# Patient Record
Sex: FEMALE | Race: WHITE | NOT HISPANIC OR LATINO | Employment: OTHER | URBAN - METROPOLITAN AREA
[De-identification: names, ages, dates, MRNs, and addresses within clinical notes are randomized per-mention and may not be internally consistent; named-entity substitution may affect disease eponyms.]

---

## 2021-12-02 PROCEDURE — U0003 INFECTIOUS AGENT DETECTION BY NUCLEIC ACID (DNA OR RNA); SEVERE ACUTE RESPIRATORY SYNDROME CORONAVIRUS 2 (SARS-COV-2) (CORONAVIRUS DISEASE [COVID-19]), AMPLIFIED PROBE TECHNIQUE, MAKING USE OF HIGH THROUGHPUT TECHNOLOGIES AS DESCRIBED BY CMS-2020-01-R: HCPCS | Mod: ORL | Performed by: FAMILY MEDICINE

## 2021-12-03 ENCOUNTER — LAB REQUISITION (OUTPATIENT)
Dept: LAB | Facility: CLINIC | Age: 86
End: 2021-12-03
Payer: MEDICARE

## 2021-12-04 LAB — SARS-COV-2 RNA RESP QL NAA+PROBE: POSITIVE

## 2022-01-01 ENCOUNTER — LAB REQUISITION (OUTPATIENT)
Dept: LAB | Facility: CLINIC | Age: 87
End: 2022-01-01
Payer: MEDICARE

## 2022-01-01 DIAGNOSIS — R41.0 DISORIENTATION, UNSPECIFIED: ICD-10-CM

## 2022-01-01 LAB
ALBUMIN UR-MCNC: NEGATIVE MG/DL
APPEARANCE UR: CLEAR
BACTERIA #/AREA URNS HPF: ABNORMAL /HPF
BACTERIA UR CULT: NORMAL
BILIRUB UR QL STRIP: NEGATIVE
COLOR UR AUTO: COLORLESS
GLUCOSE UR STRIP-MCNC: NEGATIVE MG/DL
HGB UR QL STRIP: NEGATIVE
KETONES UR STRIP-MCNC: NEGATIVE MG/DL
LEUKOCYTE ESTERASE UR QL STRIP: ABNORMAL
NITRATE UR QL: NEGATIVE
PH UR STRIP: 5.5 [PH] (ref 5–7)
RBC URINE: <1 /HPF
SP GR UR STRIP: 1.01 (ref 1–1.03)
SQUAMOUS EPITHELIAL: 3 /HPF
UROBILINOGEN UR STRIP-MCNC: <2 MG/DL
WBC URINE: 8 /HPF

## 2022-01-01 PROCEDURE — 87086 URINE CULTURE/COLONY COUNT: CPT | Mod: ORL | Performed by: FAMILY MEDICINE

## 2022-01-01 PROCEDURE — 81001 URINALYSIS AUTO W/SCOPE: CPT | Mod: ORL | Performed by: FAMILY MEDICINE

## 2023-01-01 ENCOUNTER — DOCUMENTATION ONLY (OUTPATIENT)
Dept: OTHER | Facility: CLINIC | Age: 88
End: 2023-01-01
Payer: MEDICARE

## 2023-01-01 ENCOUNTER — HOSPITAL ENCOUNTER (INPATIENT)
Facility: CLINIC | Age: 88
LOS: 3 days | Discharge: HOSPICE | DRG: 064 | End: 2023-02-23
Attending: EMERGENCY MEDICINE | Admitting: HOSPITALIST
Payer: MEDICARE

## 2023-01-01 ENCOUNTER — APPOINTMENT (OUTPATIENT)
Dept: CT IMAGING | Facility: CLINIC | Age: 88
DRG: 064 | End: 2023-01-01
Attending: EMERGENCY MEDICINE
Payer: MEDICARE

## 2023-01-01 ENCOUNTER — HOSPITAL ENCOUNTER (INPATIENT)
Facility: CLINIC | Age: 88
LOS: 1 days | End: 2023-02-24
Attending: HOSPITALIST | Admitting: INTERNAL MEDICINE
Payer: OTHER MISCELLANEOUS

## 2023-01-01 VITALS — RESPIRATION RATE: 26 BRPM

## 2023-01-01 VITALS
SYSTOLIC BLOOD PRESSURE: 115 MMHG | DIASTOLIC BLOOD PRESSURE: 75 MMHG | TEMPERATURE: 99.9 F | HEART RATE: 80 BPM | RESPIRATION RATE: 24 BRPM | OXYGEN SATURATION: 92 %

## 2023-01-01 DIAGNOSIS — I63.512 ACUTE ISCHEMIC LEFT MCA STROKE (H): ICD-10-CM

## 2023-01-01 DIAGNOSIS — I10 UNCONTROLLED HYPERTENSION: ICD-10-CM

## 2023-01-01 DIAGNOSIS — Z66 DNR (DO NOT RESUSCITATE): ICD-10-CM

## 2023-01-01 LAB
ANION GAP SERPL CALCULATED.3IONS-SCNC: 11 MMOL/L (ref 7–15)
APTT PPP: 26 SECONDS (ref 22–38)
ATRIAL RATE - MUSE: 70 BPM
BASOPHILS # BLD AUTO: 0 10E3/UL (ref 0–0.2)
BASOPHILS NFR BLD AUTO: 0 %
BUN SERPL-MCNC: 16.9 MG/DL (ref 8–23)
CALCIUM SERPL-MCNC: 9.1 MG/DL (ref 8.2–9.6)
CHLORIDE SERPL-SCNC: 104 MMOL/L (ref 98–107)
CREAT SERPL-MCNC: 0.66 MG/DL (ref 0.51–0.95)
DEPRECATED HCO3 PLAS-SCNC: 28 MMOL/L (ref 22–29)
DIASTOLIC BLOOD PRESSURE - MUSE: NORMAL MMHG
EOSINOPHIL # BLD AUTO: 0.1 10E3/UL (ref 0–0.7)
EOSINOPHIL NFR BLD AUTO: 1 %
ERYTHROCYTE [DISTWIDTH] IN BLOOD BY AUTOMATED COUNT: 17.4 % (ref 10–15)
GFR SERPL CREATININE-BSD FRML MDRD: 81 ML/MIN/1.73M2
GLUCOSE SERPL-MCNC: 144 MG/DL (ref 70–99)
HCT VFR BLD AUTO: 39.8 % (ref 35–47)
HGB BLD-MCNC: 12 G/DL (ref 11.7–15.7)
HOLD SPECIMEN: NORMAL
IMM GRANULOCYTES # BLD: 0.1 10E3/UL
IMM GRANULOCYTES NFR BLD: 1 %
INR PPP: 0.95 (ref 0.85–1.15)
INTERPRETATION ECG - MUSE: NORMAL
LYMPHOCYTES # BLD AUTO: 3.8 10E3/UL (ref 0.8–5.3)
LYMPHOCYTES NFR BLD AUTO: 41 %
MCH RBC QN AUTO: 26.5 PG (ref 26.5–33)
MCHC RBC AUTO-ENTMCNC: 30.2 G/DL (ref 31.5–36.5)
MCV RBC AUTO: 88 FL (ref 78–100)
MONOCYTES # BLD AUTO: 0.7 10E3/UL (ref 0–1.3)
MONOCYTES NFR BLD AUTO: 8 %
NEUTROPHILS # BLD AUTO: 4.6 10E3/UL (ref 1.6–8.3)
NEUTROPHILS NFR BLD AUTO: 49 %
NRBC # BLD AUTO: 0 10E3/UL
NRBC BLD AUTO-RTO: 0 /100
P AXIS - MUSE: 57 DEGREES
PLATELET # BLD AUTO: 273 10E3/UL (ref 150–450)
POTASSIUM SERPL-SCNC: 3.6 MMOL/L (ref 3.4–5.3)
PR INTERVAL - MUSE: 182 MS
QRS DURATION - MUSE: 84 MS
QT - MUSE: 430 MS
QTC - MUSE: 464 MS
R AXIS - MUSE: 9 DEGREES
RBC # BLD AUTO: 4.52 10E6/UL (ref 3.8–5.2)
SODIUM SERPL-SCNC: 143 MMOL/L (ref 136–145)
SYSTOLIC BLOOD PRESSURE - MUSE: NORMAL MMHG
T AXIS - MUSE: 106 DEGREES
TROPONIN T SERPL HS-MCNC: 19 NG/L
VENTRICULAR RATE- MUSE: 70 BPM
WBC # BLD AUTO: 9.3 10E3/UL (ref 4–11)

## 2023-01-01 PROCEDURE — 85014 HEMATOCRIT: CPT | Performed by: EMERGENCY MEDICINE

## 2023-01-01 PROCEDURE — 250N000013 HC RX MED GY IP 250 OP 250 PS 637

## 2023-01-01 PROCEDURE — 250N000009 HC RX 250

## 2023-01-01 PROCEDURE — 110N000005 HC R&B HOSPICE, ACCENT

## 2023-01-01 PROCEDURE — 99207 PR NO BILLABLE SERVICE THIS VISIT: CPT | Performed by: PHYSICIAN ASSISTANT

## 2023-01-01 PROCEDURE — 99291 CRITICAL CARE FIRST HOUR: CPT | Performed by: PHYSICIAN ASSISTANT

## 2023-01-01 PROCEDURE — 70450 CT HEAD/BRAIN W/O DYE: CPT | Mod: MG

## 2023-01-01 PROCEDURE — 82310 ASSAY OF CALCIUM: CPT | Performed by: EMERGENCY MEDICINE

## 2023-01-01 PROCEDURE — 250N000011 HC RX IP 250 OP 636: Performed by: EMERGENCY MEDICINE

## 2023-01-01 PROCEDURE — 99292 CRITICAL CARE ADDL 30 MIN: CPT

## 2023-01-01 PROCEDURE — 99231 SBSQ HOSP IP/OBS SF/LOW 25: CPT

## 2023-01-01 PROCEDURE — 250N000013 HC RX MED GY IP 250 OP 250 PS 637: Performed by: PHYSICIAN ASSISTANT

## 2023-01-01 PROCEDURE — 250N000009 HC RX 250: Performed by: PHYSICIAN ASSISTANT

## 2023-01-01 PROCEDURE — 120N000001 HC R&B MED SURG/OB

## 2023-01-01 PROCEDURE — 250N000011 HC RX IP 250 OP 636

## 2023-01-01 PROCEDURE — 250N000009 HC RX 250: Performed by: EMERGENCY MEDICINE

## 2023-01-01 PROCEDURE — 36415 COLL VENOUS BLD VENIPUNCTURE: CPT | Performed by: EMERGENCY MEDICINE

## 2023-01-01 PROCEDURE — 85041 AUTOMATED RBC COUNT: CPT | Performed by: EMERGENCY MEDICINE

## 2023-01-01 PROCEDURE — 85610 PROTHROMBIN TIME: CPT | Performed by: EMERGENCY MEDICINE

## 2023-01-01 PROCEDURE — 93005 ELECTROCARDIOGRAM TRACING: CPT

## 2023-01-01 PROCEDURE — 85730 THROMBOPLASTIN TIME PARTIAL: CPT | Performed by: EMERGENCY MEDICINE

## 2023-01-01 PROCEDURE — 99291 CRITICAL CARE FIRST HOUR: CPT | Mod: 25

## 2023-01-01 PROCEDURE — 99231 SBSQ HOSP IP/OBS SF/LOW 25: CPT | Performed by: PHYSICIAN ASSISTANT

## 2023-01-01 PROCEDURE — G1010 CDSM STANSON: HCPCS

## 2023-01-01 PROCEDURE — 70496 CT ANGIOGRAPHY HEAD: CPT | Mod: MG

## 2023-01-01 PROCEDURE — 96374 THER/PROPH/DIAG INJ IV PUSH: CPT | Mod: 59

## 2023-01-01 PROCEDURE — 84484 ASSAY OF TROPONIN QUANT: CPT | Performed by: EMERGENCY MEDICINE

## 2023-01-01 RX ORDER — ACETAMINOPHEN 650 MG/1
650 SUPPOSITORY RECTAL EVERY 6 HOURS PRN
Status: DISCONTINUED | OUTPATIENT
Start: 2023-01-01 | End: 2023-01-01

## 2023-01-01 RX ORDER — PROCHLORPERAZINE MALEATE 5 MG
5 TABLET ORAL EVERY 6 HOURS PRN
Status: DISCONTINUED | OUTPATIENT
Start: 2023-01-01 | End: 2023-01-01

## 2023-01-01 RX ORDER — CARBOXYMETHYLCELLULOSE SODIUM 5 MG/ML
1-2 SOLUTION/ DROPS OPHTHALMIC
Status: DISCONTINUED | OUTPATIENT
Start: 2023-01-01 | End: 2023-01-01 | Stop reason: HOSPADM

## 2023-01-01 RX ORDER — MORPHINE SULFATE 20 MG/ML
5 SOLUTION ORAL
Status: DISCONTINUED | OUTPATIENT
Start: 2023-01-01 | End: 2023-01-01

## 2023-01-01 RX ORDER — LORAZEPAM 0.5 MG/1
1 TABLET ORAL
Status: DISCONTINUED | OUTPATIENT
Start: 2023-01-01 | End: 2023-01-01 | Stop reason: HOSPADM

## 2023-01-01 RX ORDER — ONDANSETRON 2 MG/ML
4 INJECTION INTRAMUSCULAR; INTRAVENOUS EVERY 6 HOURS PRN
Status: CANCELLED | OUTPATIENT
Start: 2023-01-01

## 2023-01-01 RX ORDER — NALOXONE HYDROCHLORIDE 0.4 MG/ML
0.1 INJECTION, SOLUTION INTRAMUSCULAR; INTRAVENOUS; SUBCUTANEOUS
Status: DISCONTINUED | OUTPATIENT
Start: 2023-01-01 | End: 2023-01-01

## 2023-01-01 RX ORDER — PROCHLORPERAZINE 25 MG
12.5 SUPPOSITORY, RECTAL RECTAL EVERY 12 HOURS PRN
Status: CANCELLED | OUTPATIENT
Start: 2023-01-01

## 2023-01-01 RX ORDER — NALOXONE HYDROCHLORIDE 0.4 MG/ML
0.1 INJECTION, SOLUTION INTRAMUSCULAR; INTRAVENOUS; SUBCUTANEOUS
Status: CANCELLED | OUTPATIENT
Start: 2023-01-01

## 2023-01-01 RX ORDER — MORPHINE SULFATE 2 MG/ML
1 INJECTION, SOLUTION INTRAMUSCULAR; INTRAVENOUS
Status: CANCELLED | OUTPATIENT
Start: 2023-01-01

## 2023-01-01 RX ORDER — LORAZEPAM 2 MG/ML
1 INJECTION INTRAMUSCULAR
Status: DISCONTINUED | OUTPATIENT
Start: 2023-01-01 | End: 2023-01-01

## 2023-01-01 RX ORDER — LORAZEPAM 0.5 MG/1
1 TABLET ORAL
Status: CANCELLED | OUTPATIENT
Start: 2023-01-01

## 2023-01-01 RX ORDER — CARBOXYMETHYLCELLULOSE SODIUM 5 MG/ML
1-2 SOLUTION/ DROPS OPHTHALMIC
Status: CANCELLED | OUTPATIENT
Start: 2023-01-01

## 2023-01-01 RX ORDER — NALOXONE HYDROCHLORIDE 0.4 MG/ML
0.2 INJECTION, SOLUTION INTRAMUSCULAR; INTRAVENOUS; SUBCUTANEOUS
Status: DISCONTINUED | OUTPATIENT
Start: 2023-01-01 | End: 2023-01-01 | Stop reason: HOSPADM

## 2023-01-01 RX ORDER — LORAZEPAM 2 MG/ML
1 INJECTION INTRAMUSCULAR
Status: DISCONTINUED | OUTPATIENT
Start: 2023-01-01 | End: 2023-01-01 | Stop reason: HOSPADM

## 2023-01-01 RX ORDER — ATROPINE SULFATE 10 MG/ML
2 SOLUTION/ DROPS OPHTHALMIC EVERY 4 HOURS PRN
Status: DISCONTINUED | OUTPATIENT
Start: 2023-01-01 | End: 2023-01-01 | Stop reason: HOSPADM

## 2023-01-01 RX ORDER — ONDANSETRON 2 MG/ML
4 INJECTION INTRAMUSCULAR; INTRAVENOUS ONCE
Status: COMPLETED | OUTPATIENT
Start: 2023-01-01 | End: 2023-01-01

## 2023-01-01 RX ORDER — ONDANSETRON 4 MG/1
4 TABLET, ORALLY DISINTEGRATING ORAL EVERY 6 HOURS PRN
Status: DISCONTINUED | OUTPATIENT
Start: 2023-01-01 | End: 2023-01-01

## 2023-01-01 RX ORDER — NALOXONE HYDROCHLORIDE 0.4 MG/ML
0.2 INJECTION, SOLUTION INTRAMUSCULAR; INTRAVENOUS; SUBCUTANEOUS
Status: DISCONTINUED | OUTPATIENT
Start: 2023-01-01 | End: 2023-01-01

## 2023-01-01 RX ORDER — MORPHINE SULFATE 2 MG/ML
2 INJECTION, SOLUTION INTRAMUSCULAR; INTRAVENOUS
Status: DISCONTINUED | OUTPATIENT
Start: 2023-01-01 | End: 2023-01-01

## 2023-01-01 RX ORDER — ACETAMINOPHEN 650 MG/1
650 SUPPOSITORY RECTAL EVERY 6 HOURS PRN
Status: DISCONTINUED | OUTPATIENT
Start: 2023-01-01 | End: 2023-01-01 | Stop reason: HOSPADM

## 2023-01-01 RX ORDER — MORPHINE SULFATE 20 MG/ML
10 SOLUTION ORAL
Status: DISCONTINUED | OUTPATIENT
Start: 2023-01-01 | End: 2023-01-01 | Stop reason: HOSPADM

## 2023-01-01 RX ORDER — PROCHLORPERAZINE 25 MG
12.5 SUPPOSITORY, RECTAL RECTAL EVERY 12 HOURS PRN
Status: DISCONTINUED | OUTPATIENT
Start: 2023-01-01 | End: 2023-01-01

## 2023-01-01 RX ORDER — MORPHINE SULFATE 2 MG/ML
2 INJECTION, SOLUTION INTRAMUSCULAR; INTRAVENOUS
Status: DISCONTINUED | OUTPATIENT
Start: 2023-01-01 | End: 2023-01-01 | Stop reason: HOSPADM

## 2023-01-01 RX ORDER — ONDANSETRON 4 MG/1
4 TABLET, ORALLY DISINTEGRATING ORAL EVERY 6 HOURS PRN
Status: CANCELLED | OUTPATIENT
Start: 2023-01-01

## 2023-01-01 RX ORDER — ONDANSETRON 2 MG/ML
4 INJECTION INTRAMUSCULAR; INTRAVENOUS EVERY 6 HOURS PRN
Status: DISCONTINUED | OUTPATIENT
Start: 2023-01-01 | End: 2023-01-01 | Stop reason: HOSPADM

## 2023-01-01 RX ORDER — ECHINACEA PURPUREA EXTRACT 125 MG
1 TABLET ORAL
Status: DISCONTINUED | OUTPATIENT
Start: 2023-01-01 | End: 2023-01-01

## 2023-01-01 RX ORDER — PROCHLORPERAZINE 25 MG
12.5 SUPPOSITORY, RECTAL RECTAL EVERY 12 HOURS PRN
Status: DISCONTINUED | OUTPATIENT
Start: 2023-01-01 | End: 2023-01-01 | Stop reason: HOSPADM

## 2023-01-01 RX ORDER — MORPHINE SULFATE 10 MG/5ML
5 SOLUTION ORAL
Status: DISCONTINUED | OUTPATIENT
Start: 2023-01-01 | End: 2023-01-01

## 2023-01-01 RX ORDER — PROCHLORPERAZINE MALEATE 5 MG
5 TABLET ORAL EVERY 6 HOURS PRN
Status: DISCONTINUED | OUTPATIENT
Start: 2023-01-01 | End: 2023-01-01 | Stop reason: HOSPADM

## 2023-01-01 RX ORDER — NALOXONE HYDROCHLORIDE 0.4 MG/ML
0.1 INJECTION, SOLUTION INTRAMUSCULAR; INTRAVENOUS; SUBCUTANEOUS
Status: DISCONTINUED | OUTPATIENT
Start: 2023-01-01 | End: 2023-01-01 | Stop reason: HOSPADM

## 2023-01-01 RX ORDER — ONDANSETRON 4 MG/1
4 TABLET, ORALLY DISINTEGRATING ORAL EVERY 6 HOURS PRN
Status: DISCONTINUED | OUTPATIENT
Start: 2023-01-01 | End: 2023-01-01 | Stop reason: HOSPADM

## 2023-01-01 RX ORDER — MINERAL OIL/HYDROPHIL PETROLAT
OINTMENT (GRAM) TOPICAL
Status: DISCONTINUED | OUTPATIENT
Start: 2023-01-01 | End: 2023-01-01

## 2023-01-01 RX ORDER — CARBOXYMETHYLCELLULOSE SODIUM 5 MG/ML
1-2 SOLUTION/ DROPS OPHTHALMIC
Status: DISCONTINUED | OUTPATIENT
Start: 2023-01-01 | End: 2023-01-01

## 2023-01-01 RX ORDER — NALOXONE HYDROCHLORIDE 0.4 MG/ML
0.2 INJECTION, SOLUTION INTRAMUSCULAR; INTRAVENOUS; SUBCUTANEOUS
Status: CANCELLED | OUTPATIENT
Start: 2023-01-01

## 2023-01-01 RX ORDER — LORAZEPAM 2 MG/ML
1 INJECTION INTRAMUSCULAR
Status: CANCELLED | OUTPATIENT
Start: 2023-01-01

## 2023-01-01 RX ORDER — PROCHLORPERAZINE MALEATE 5 MG
5 TABLET ORAL EVERY 6 HOURS PRN
Status: CANCELLED | OUTPATIENT
Start: 2023-01-01

## 2023-01-01 RX ORDER — ATROPINE SULFATE 10 MG/ML
2 SOLUTION/ DROPS OPHTHALMIC EVERY 4 HOURS PRN
Status: DISCONTINUED | OUTPATIENT
Start: 2023-01-01 | End: 2023-01-01

## 2023-01-01 RX ORDER — ONDANSETRON 2 MG/ML
4 INJECTION INTRAMUSCULAR; INTRAVENOUS EVERY 6 HOURS PRN
Status: DISCONTINUED | OUTPATIENT
Start: 2023-01-01 | End: 2023-01-01

## 2023-01-01 RX ORDER — LORAZEPAM 0.5 MG/1
1 TABLET ORAL
Status: DISCONTINUED | OUTPATIENT
Start: 2023-01-01 | End: 2023-01-01

## 2023-01-01 RX ORDER — ACETAMINOPHEN 650 MG/1
650 SUPPOSITORY RECTAL EVERY 6 HOURS PRN
Status: CANCELLED | OUTPATIENT
Start: 2023-01-01

## 2023-01-01 RX ORDER — MORPHINE SULFATE 2 MG/ML
1 INJECTION, SOLUTION INTRAMUSCULAR; INTRAVENOUS
Status: DISCONTINUED | OUTPATIENT
Start: 2023-01-01 | End: 2023-01-01

## 2023-01-01 RX ORDER — MORPHINE SULFATE 2 MG/ML
1 INJECTION, SOLUTION INTRAMUSCULAR; INTRAVENOUS
Status: DISCONTINUED | OUTPATIENT
Start: 2023-01-01 | End: 2023-01-01 | Stop reason: HOSPADM

## 2023-01-01 RX ORDER — IOPAMIDOL 755 MG/ML
75 INJECTION, SOLUTION INTRAVASCULAR ONCE
Status: COMPLETED | OUTPATIENT
Start: 2023-01-01 | End: 2023-01-01

## 2023-01-01 RX ORDER — MORPHINE SULFATE 2 MG/ML
2 INJECTION, SOLUTION INTRAMUSCULAR; INTRAVENOUS
Status: CANCELLED | OUTPATIENT
Start: 2023-01-01

## 2023-01-01 RX ORDER — GLYCOPYRROLATE 0.2 MG/ML
0.2 INJECTION, SOLUTION INTRAMUSCULAR; INTRAVENOUS EVERY 4 HOURS
Status: DISCONTINUED | OUTPATIENT
Start: 2023-01-01 | End: 2023-01-01 | Stop reason: HOSPADM

## 2023-01-01 RX ORDER — MORPHINE SULFATE 4 MG/ML
4 INJECTION, SOLUTION INTRAMUSCULAR; INTRAVENOUS
Status: DISCONTINUED | OUTPATIENT
Start: 2023-01-01 | End: 2023-01-01

## 2023-01-01 RX ORDER — ATROPINE SULFATE 10 MG/ML
2 SOLUTION/ DROPS OPHTHALMIC EVERY 4 HOURS PRN
Status: CANCELLED | OUTPATIENT
Start: 2023-01-01

## 2023-01-01 RX ORDER — MORPHINE SULFATE 10 MG/5ML
10 SOLUTION ORAL
Status: DISCONTINUED | OUTPATIENT
Start: 2023-01-01 | End: 2023-01-01

## 2023-01-01 RX ORDER — MORPHINE SULFATE 20 MG/ML
10 SOLUTION ORAL
Status: DISCONTINUED | OUTPATIENT
Start: 2023-01-01 | End: 2023-01-01

## 2023-01-01 RX ADMIN — MORPHINE SULFATE 2 MG: 2 INJECTION, SOLUTION INTRAMUSCULAR; INTRAVENOUS at 05:27

## 2023-01-01 RX ADMIN — MORPHINE SULFATE 10 MG: 100 SOLUTION ORAL at 04:02

## 2023-01-01 RX ADMIN — SODIUM CHLORIDE 100 ML: 900 INJECTION INTRAVENOUS at 09:21

## 2023-01-01 RX ADMIN — ATROPINE SULFATE 2 DROP: 10 SOLUTION/ DROPS OPHTHALMIC at 20:03

## 2023-01-01 RX ADMIN — GLYCOPYRROLATE 0.2 MG: 0.2 INJECTION, SOLUTION INTRAMUSCULAR; INTRAVENOUS at 19:35

## 2023-01-01 RX ADMIN — MORPHINE SULFATE 10 MG: 100 SOLUTION ORAL at 20:11

## 2023-01-01 RX ADMIN — MORPHINE SULFATE 10 MG: 100 SOLUTION ORAL at 12:10

## 2023-01-01 RX ADMIN — MORPHINE SULFATE 10 MG: 100 SOLUTION ORAL at 12:58

## 2023-01-01 RX ADMIN — MORPHINE SULFATE 10 MG: 100 SOLUTION ORAL at 22:13

## 2023-01-01 RX ADMIN — GLYCOPYRROLATE 0.2 MG: 0.2 INJECTION, SOLUTION INTRAMUSCULAR; INTRAVENOUS at 15:14

## 2023-01-01 RX ADMIN — IOPAMIDOL 75 ML: 755 INJECTION, SOLUTION INTRAVENOUS at 09:22

## 2023-01-01 RX ADMIN — GLYCOPYRROLATE 0.2 MG: 0.2 INJECTION, SOLUTION INTRAMUSCULAR; INTRAVENOUS at 23:55

## 2023-01-01 RX ADMIN — MORPHINE SULFATE 10 MG: 100 SOLUTION ORAL at 18:26

## 2023-01-01 RX ADMIN — MORPHINE SULFATE 5 MG: 10 SOLUTION ORAL at 20:30

## 2023-01-01 RX ADMIN — MORPHINE SULFATE 10 MG: 100 SOLUTION ORAL at 04:53

## 2023-01-01 RX ADMIN — ATROPINE SULFATE 2 DROP: 10 SOLUTION/ DROPS OPHTHALMIC at 23:48

## 2023-01-01 RX ADMIN — MORPHINE SULFATE 10 MG: 100 SOLUTION ORAL at 05:53

## 2023-01-01 RX ADMIN — MORPHINE SULFATE 10 MG: 100 SOLUTION ORAL at 02:38

## 2023-01-01 RX ADMIN — MORPHINE SULFATE 10 MG: 100 SOLUTION ORAL at 01:26

## 2023-01-01 RX ADMIN — MORPHINE SULFATE 5 MG: 10 SOLUTION ORAL at 08:53

## 2023-01-01 RX ADMIN — GLYCOPYRROLATE 0.2 MG: 0.2 INJECTION, SOLUTION INTRAMUSCULAR; INTRAVENOUS at 04:02

## 2023-01-01 RX ADMIN — MORPHINE SULFATE 1 MG: 2 INJECTION, SOLUTION INTRAMUSCULAR; INTRAVENOUS at 02:08

## 2023-01-01 RX ADMIN — MORPHINE SULFATE 1 MG: 2 INJECTION, SOLUTION INTRAMUSCULAR; INTRAVENOUS at 20:09

## 2023-01-01 RX ADMIN — MORPHINE SULFATE 10 MG: 100 SOLUTION ORAL at 10:23

## 2023-01-01 RX ADMIN — ONDANSETRON 4 MG: 2 INJECTION INTRAMUSCULAR; INTRAVENOUS at 09:34

## 2023-01-01 RX ADMIN — ATROPINE SULFATE 2 DROP: 10 SOLUTION/ DROPS OPHTHALMIC at 18:52

## 2023-01-01 RX ADMIN — GLYCOPYRROLATE 0.2 MG: 0.2 INJECTION, SOLUTION INTRAMUSCULAR; INTRAVENOUS at 09:13

## 2023-01-01 RX ADMIN — MORPHINE SULFATE 10 MG: 100 SOLUTION ORAL at 23:55

## 2023-01-01 RX ADMIN — ATROPINE SULFATE 2 DROP: 10 SOLUTION/ DROPS OPHTHALMIC at 09:19

## 2023-01-01 RX ADMIN — LORAZEPAM 1 MG: 2 INJECTION INTRAMUSCULAR; INTRAVENOUS at 19:59

## 2023-01-01 RX ADMIN — GLYCOPYRROLATE 0.2 MG: 0.2 INJECTION, SOLUTION INTRAMUSCULAR; INTRAVENOUS at 11:56

## 2023-01-01 RX ADMIN — ATROPINE SULFATE 2 DROP: 10 SOLUTION/ DROPS OPHTHALMIC at 04:37

## 2023-01-01 RX ADMIN — MORPHINE SULFATE 5 MG: 10 SOLUTION ORAL at 01:55

## 2023-01-01 RX ADMIN — MORPHINE SULFATE 10 MG: 100 SOLUTION ORAL at 09:13

## 2023-01-01 RX ADMIN — MORPHINE SULFATE 5 MG: 10 SOLUTION ORAL at 01:23

## 2023-01-01 ASSESSMENT — ACTIVITIES OF DAILY LIVING (ADL)
ADLS_ACUITY_SCORE: 45
ADLS_ACUITY_SCORE: 35
ADLS_ACUITY_SCORE: 45
ADLS_ACUITY_SCORE: 51
ADLS_ACUITY_SCORE: 45
ADLS_ACUITY_SCORE: 49
ADLS_ACUITY_SCORE: 49
ADLS_ACUITY_SCORE: 45
ADLS_ACUITY_SCORE: 53
ADLS_ACUITY_SCORE: 53
ADLS_ACUITY_SCORE: 51
ADLS_ACUITY_SCORE: 51
ADLS_ACUITY_SCORE: 45
ADLS_ACUITY_SCORE: 41
ADLS_ACUITY_SCORE: 51
ADLS_ACUITY_SCORE: 45
ADLS_ACUITY_SCORE: 35
ADLS_ACUITY_SCORE: 35
ADLS_ACUITY_SCORE: 53
ADLS_ACUITY_SCORE: 45
ADLS_ACUITY_SCORE: 51
ADLS_ACUITY_SCORE: 51
ADLS_ACUITY_SCORE: 45
ADLS_ACUITY_SCORE: 47
ADLS_ACUITY_SCORE: 41
ADLS_ACUITY_SCORE: 51
ADLS_ACUITY_SCORE: 41
ADLS_ACUITY_SCORE: 35
ADLS_ACUITY_SCORE: 47
ADLS_ACUITY_SCORE: 35
ADLS_ACUITY_SCORE: 47
ADLS_ACUITY_SCORE: 45
ADLS_ACUITY_SCORE: 45
ADLS_ACUITY_SCORE: 51
ADLS_ACUITY_SCORE: 45
ADLS_ACUITY_SCORE: 51
ADLS_ACUITY_SCORE: 45
ADLS_ACUITY_SCORE: 45
ADLS_ACUITY_SCORE: 51
ADLS_ACUITY_SCORE: 45
ADLS_ACUITY_SCORE: 53
ADLS_ACUITY_SCORE: 35
ADLS_ACUITY_SCORE: 47
ADLS_ACUITY_SCORE: 45
ADLS_ACUITY_SCORE: 47
ADLS_ACUITY_SCORE: 47
ADLS_ACUITY_SCORE: 35
ADLS_ACUITY_SCORE: 35

## 2023-02-20 PROBLEM — I63.511 ACUTE ISCHEMIC RIGHT MCA STROKE (H): Status: ACTIVE | Noted: 2023-01-01

## 2023-02-20 PROBLEM — Z51.5 END OF LIFE CARE: Status: ACTIVE | Noted: 2023-01-01

## 2023-02-20 NOTE — ED PROVIDER NOTES
History     Chief Complaint:  Loss of Consciousness    The history is provided by the EMS personnel. The history is limited by the condition of the patient.      Vero Mcfarland is a 89 year old female with a history of hypertension who presents with loss of consciousness. Vero arrives via EMS from her assisted living facility. Per EMS, staff say she had breakfast this morning and was in her baseline state of health, returning to her room around 0815 (1 hour prior to arrival). She was then found be staff shortly thereafter slumped over on the toilet - breathing but unresponsive. Per EMS, blood sugar on arrival was 125, oxygen saturation on room air was 92%.     Of note, she arrives with POLST and is DNR/DNI with comfort focused care.    Independent Historian: as above    Review of External Notes: Hospitalization note from October 2022 at [facility], where she was admitted after a fall.     ROS:  Review of Systems   Unable to perform ROS: Patient unresponsive     Allergies:  Codeine     Medications:    Lexapro  Buspar  Lipitor  Norvasc  Zestril  Toprol  Synthroid  Oretic     Past Medical History:    Anxiety  Vertigo  Visual loss, right eye  Hyperlipidemia  IBS  Polychlonal hypergammaglobulinemia  Nonrheumatic mitral valve insufficiency  Nonrheumatic aortic valve insufficiency  Hypertension  CAD  Senile macular degeneration of retina  Glaucoma  Thyroid cancer   SBO   Pleurisy     Past Surgical History:    Thyroidectomy  Hysterectomy  Cholecystectomy  Trigger finger release  CABG  ANURADHA and BSO  Release of intestinal adhesions      Family History:    Father: CAD, MI  Mother: breast cancer  Sister: Alzheimer's, breast cancer     Social History:  Presents to the ED alone via EMS, coming from [facility], the assisted living home where she lives in the memory care unit.   Daughter lives in [place] but there are 2 sons in the area.    Physical Exam     Patient Vitals for the past 24 hrs:   BP Temp Temp src Pulse Resp SpO2    02/20/23 1535 -- -- -- -- -- (!) 87 %   02/20/23 1520 -- -- -- -- -- (!) 87 %   02/20/23 1505 -- -- -- -- -- (!) 88 %   02/20/23 1450 -- -- -- -- -- (!) 87 %   02/20/23 1435 -- -- -- -- -- 90 %   02/20/23 1307 -- -- -- -- -- (!) 88 %   02/20/23 1207 -- -- -- -- -- (!) 87 %   02/20/23 1147 (!) 166/75 -- -- 75 -- 92 %   02/20/23 1104 (!) 190/101 -- -- 71 18 98 %   02/20/23 1034 (!) 190/86 -- -- 65 16 99 %   02/20/23 1004 (!) 210/79 -- -- 65 13 99 %   02/20/23 0949 (!) 200/86 -- -- 64 17 92 %   02/20/23 0946 (!) 200/86 97.6  F (36.4  C) Oral 65 18 95 %   02/20/23 0933 (!) 256/104 -- -- 78 13 96 %   02/20/23 0927 (!) 227/104 -- -- 69 18 91 %   02/20/23 0924 (!) 226/102 -- -- -- -- --      Physical Exam  General: Well-developed and well-nourished.  Comfortable appearing elderly  woman.   Head:  Atraumatic.  Eyes:  Conjunctivae, lids, and sclerae are normal.  ENT:    Normal nose. Moist mucous membranes.  Neck:  Supple. Normal range of motion.  CV:  Regular rate and rhythm. Normal heart sounds with no murmurs, rubs, or gallops detected.  Resp:  No respiratory distress. Clear to auscultation bilaterally without decreased breath sounds, wheezing, rales, or rhonchi.  GI:  Soft. Non-distended. Non-tender.    MS:  Normal ROM. No bilateral lower extremity edema.  Skin:  Warm. Non-diaphoretic. No pallor.  Neuro: Comatose/unresponsive.  GCS 6, withdraws from pain.    Weakness in all extremities but the right upper extremity appears to have flaccid paralysis    Right-sided facial droop.  Right hemineglect.    Mute.  Does not follow any commands.    PERRL.   Psych:  Unable to assess.  Vitals reviewed.    Emergency Department Course   ECG  ECG taken at 0933, ECG read at 0943  Rate 70 bpm. MT interval 182 ms. QRS duration 84 ms. QT/QTc 430/464 ms.  Normal sinus rhythm. ST and T wave abnormality, consider lateral ischemia.    No acute ST changes.  No prior for comparison.    Imaging:  CTA Head Neck w Contrast   Final Result    IMPRESSION:   1. Occlusion of the proximal dominant superior division M2 branch of   the left middle cerebral artery. Poor collateral supply in the left   MCA superior division territory.   2. 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 Occlusion of a predominantly azygos configuration dominant   anterior cerebral artery at the distal A2 segment with poor collateral   supply in the left TIFFANIE territory. There is some collateral   flow/vascular supply in the right TIFFANIE territory, although the   bilateral TIFFANIE territories are at risk for infarction.   3. Mild atherosclerosis in the carotid siphons without significant   stenosis.   4. Bilateral carotid bifurcation atherosclerosis with less than 50%   stenosis by NASCET criteria.   5. Patent bilateral cervical vertebral arteries.      Findings from the noncontrast head CT and CTA head and neck were   discussed by myself with Dr. Robles at 9:40 AM on 2/20/2023.       SURENDRA CARRASQUILLO MD            SYSTEM ID:  [id number]      CT Head w/o Contrast   Final Result   IMPRESSION:   1. No acute intrarenal hemorrhage, extraaxial fluid collection, or   mass effect.   2. Chronic infarct in the left inferior frontal gyrus.   3. Small foci of probable age-indeterminate ischemic change in the   right corona radiata and right cerebellar hemisphere. Consider MRI for   further evaluation.   4. Brain atrophy and presumed chronic small vessel ischemic changes,   as described.      SURENDRA CARRASQUILLO MD            SYSTEM ID:  [id number]         Report per radiology    Laboratory:  Labs Ordered and Resulted from Time of ED Arrival to Time of ED Departure   BASIC METABOLIC PANEL - Abnormal       Result Value    Sodium 143      Potassium 3.6      Chloride 104      Carbon Dioxide (CO2) 28      Anion Gap 11      Urea Nitrogen 16.9      Creatinine 0.66      Calcium 9.1      Glucose 144 (*)     GFR Estimate 81     TROPONIN T, HIGH SENSITIVITY - Abnormal    Troponin T, High  Sensitivity 19 (*)    CBC WITH PLATELETS AND DIFFERENTIAL - Abnormal    WBC Count 9.3      RBC Count 4.52      Hemoglobin 12.0      Hematocrit 39.8      MCV 88      MCH 26.5      MCHC 30.2 (*)     RDW 17.4 (*)     Platelet Count 273      % Neutrophils 49      % Lymphocytes 41      % Monocytes 8      % Eosinophils 1      % Basophils 0      % Immature Granulocytes 1      NRBCs per 100 WBC 0      Absolute Neutrophils 4.6      Absolute Lymphocytes 3.8      Absolute Monocytes 0.7      Absolute Eosinophils 0.1      Absolute Basophils 0.0      Absolute Immature Granulocytes 0.1      Absolute NRBCs 0.0     INR - Normal    INR 0.95     PARTIAL THROMBOPLASTIN TIME - Normal    aPTT 26     GLUCOSE MONITOR NURSING POCT     Emergency Department Course & Assessments:    Interventions:  Medications   ondansetron (ZOFRAN) injection 4 mg (4 mg Intravenous Given 2/20/23 0934)      Independent Interpretation (X-rays, CTs, rhythm strip):  0928 I reviewed patient's head CT    Consultations/Discussion of Management or Tests:  0918 I spoke with Rose Mary David PA-C with stroke neurology, regarding the patient.   0939 Rechecked the patient and spoke again with Rose Mary David at bedside. She has contacted the patient's family who is agreeing to proceed with comfort focused care.   0941 I spoke with Dr. Quarles, radiology, regarding the patient's imaging.   0948 I spoke with Dr. Carrington, hospitalist, who agreed to accept the patient.     Assessments:  0910 Patient arrives via EMS. I obtain history and examine the patient as noted above.   0914 Called Tier I Code Stroke.   0939 I rechecked the patient and spoke with Rose Mary David as noted above.   0945 I rechecked the patient.     Disposition:  The patient was admitted to the hospital under the care of Dr. Carrington.     Impression & Plan    CMS Diagnoses: The patient has stroke symptoms:         ED Stroke specific documentation           NIHSS PDF     Patient last known well time:  0800  ED Provider first to bedside at: 0910  CT Results received at: 0941    Thrombolytics:   Not given due to:   - patient/family declined    If treating with thrombolytics: Ensure SBP<180 and DBP<105 prior to treatment with thrombolytics.  Administering thrombolytics after treatment with IV labetalol, hydralazine, or nicardipine is reasonable once BP control is established.    Endovascular Retrieval:  Family declined, Patient DNR/Comfort care    National [employer] Health Stroke Scale (Baseline)  Time Performed: 0910     Score    Level of consciousness: (2)   Not alert; repeat stim to attend strong stim/pain to move    LOC questions: (2)   Answers neither question correctly    LOC commands: (2)   Performs neither task correctly    Best gaze: (2)   Forced deviation    Visual: (0)   No visual loss    Facial palsy: (3)   Complete paralysis of one or both sides    Motor arm (left): (4)   No movement    Motor arm (right): (4)   No movement    Motor leg (left): (4)   No movement    Motor leg (right): (4)   No movement    Limb ataxia: (0)   Absent    Sensory: (0)   Normal- no sensory loss    Best language: (3)   Mute- global aphasia    Dysarthria: UN Intubated or other physical barrier: unresponsive    Extinction and inattention: (0)   No abnormality        Total Score:  30      Medical Decision Making:  Vero is a 89 year old woman who was reportedly in her baseline state of health 1 hour prior to arrival but then just before arrival was found unresponsive.  Upon arrival to the emergency department she is markedly hypertensive with a maximum of 256 systolic.  She has evidence of a large CVA with GCS of 6, withdrawing from pain.  She has right-sided facial droop and right hemineglect.  A tier 1 code stroke was called and she was immediately sent for CT scan.  However, shortly thereafter we did confirm this patient is DNR/DNI and prefers comfort focused care.    CT of the head does not reveal intracranial hemorrhage  although CTA of the head and neck reveals M2 occlusion as well as involvement of the TIFFANIE.  The remainder of her work-up is otherwise unremarkable with trivial troponin of 19.    I did speak with stroke neurology PA, Rose Mary Torres, who evaluated the patient at bedside.  She also spoke with the patient's family and confirmed the preference for comfort focused care.  As such, no interventions were provided in the emergency department outside of [facility].  I did order morphine as needed if she appeared to be uncomfortable but this was not required.  I spoke with the hospitalist, , who accepts admission and has no further orders.    Diagnosis:    ICD-10-CM    1. Acute ischemic left MCA stroke (H)  I63.512       2. Uncontrolled hypertension  I10       3. DNR (do not resuscitate)  Z66             Scribe Disclosure:  I, [place]uj[place], am serving as a scribe at 9:15 AM on 2/20/2023 to document services personally performed by Linnette Robles MD based on my observations and the provider's statements to me.   2/20/2023   Linnette Robles MD Dixson, Kylie S, MD  02/21/23 0841

## 2023-02-20 NOTE — CONSULTS
[facility]    Stroke Consult Note    Reason for Consult: Stroke Code     Chief Complaint: Loss of Consciousness      HPI  Vero Mcfarland is a 89 year old female CAD, HLD, HTN, AV insufficiency, polyclonal hypergammaglobulinemia, sensorineural hearing loss, vertigo, macular degeneration.    She presented to the [facility] ED today from her memory care facility. LKW while eating breakfast at 0800. Shortly after she was found in her room unresponsive with R sided weakness and R facial droop. In the ED BP up to 256/104. She had paperwork documenting DNR/DNI/comfort focused care. I called and spoke with her daughter, Karen, and confirmed she would not want TNK, surgery and her mother's wishes were to maintain comfort focused. On exam NIHSS 32 (see exam below).     Imaging Findings  CTH:  1. No acute intrarenal hemorrhage, extraaxial fluid collection, or  mass effect.  2. Chronic infarct in the left inferior frontal gyrus.  3. Small foci of probable age-indeterminate ischemic change in the  right corona radiata and right cerebellar hemisphere. Consider MRI for  further evaluation.  4. Brain atrophy and presumed chronic small vessel ischemic changes,  as described.    CTA head and neck:  1. Occlusion of the proximal dominant superior division M2 branch of  the left middle cerebral artery. Poor collateral supply in the left  MCA superior division territory.  2. 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 Occlusion of a predominantly azygos configuration dominant  anterior cerebral artery at the distal A2 segment with poor collateral  supply in the left TIFFANIE territory. There is some collateral  flow/vascular supply in the right TIFFANIE territory, although the  bilateral TIFFANIE territories are at risk for infarction.  3. Mild atherosclerosis in the carotid siphons without significant  stenosis.  4. Bilateral carotid bifurcation atherosclerosis with less than 50%  stenosis  "by NASCET criteria.  5. Patent bilateral cervical vertebral arteries.    Intravenous Thrombolysis  Not given due to:   - patient/family declined    Endovascular Treatment  Proximal vessel occlusion present, but endovascular treatment not initiated due to comfort goals    Impression   L TIFFANIE/MCA territory ischemic infarct due to L proximal M2 occlusion with poor collaterals, and L A2 occlusion, suspect embolic stroke of undetermined source (ESUS), comfort focus goal of care    Recommendations  -with anticipated large territory of stroke given the occlusion, expect patient would not be able to talk, eat, or take medications, goal of care is comfort so do not recommend any further stroke management including blood pressure control this would not be expected to change outcome  -I attempted to call patient's daughter a second time after CTA had resulted but there was no answer, she does have 2 sons that live locally as well    Thank you for this consult. No further stroke evaluation is recommended, so we will sign off. Please contact us with any additional questions.     Rose Mary Torres PA-C  Vascular Neurology    To page me or covering stroke neurology team member, click here: AMCOM  Choose \"On Call\" tab at top, then select \"NEUROLOGY/ALL SITES\" from middle drop-down box, press Enter, then look for \"stroke\" or \"telestroke\" for your site.    ______________________________________________________    Clinically Significant Risk Factors Present on Admission                            # Coma: based on GCS score of <8     Past Medical History   No past medical history on file.  Past Surgical History   No past surgical history on file.  Medications   Home Meds  Prior to Admission medications    Not on File       Scheduled Meds      Infusion Meds      PRN Meds  morphine    Allergies   Allergies   Allergen Reactions     Codeine      Family History   No family history on file.  Social History   Social History     Tobacco Use     " Smoking status: Unknown   Substance Use Topics     Alcohol use: Not Currently       Review of Systems   Not able to obtain due to AMS       PHYSICAL EXAMINATION  Temp:  [97.6  F (36.4  C)] 97.6  F (36.4  C)  Pulse:  [65-78] 65  Resp:  [13-18] 18  BP: (200-256)/() 200/86  SpO2:  [91 %-96 %] 95 %     General Exam  General:  Patient lying in bed, head turned to the Left, L gaze deviation, unresponsive  HEENT:  normocephalic/atraumatic  Pulmonary:  no respiratory distress    Neuro Exam  Mental Status: not alert, does open eyes to noxious, not answering orientation questions, no verbal response   Cranial Nerves: does not blink to threat b/l, PERRL 3mm,R facial droop, no verbal response   Motor: R arm and leg extension posturing to noxious, some withdrawal to L arm antigravity and leg not antigravity to noxious   Sensory: movement to noxious all extremities but extension on the right, L sided at least spatial neglect with head turned to the left  Coordination: not able to follow commands for finger to nose or heel to shin testing   Station/Gait:  deferred    Dysphagia Screen  comfort    Stroke Scales    NIHSS  1a. Level of Consciousness 3-->Responds only with reflex motor or autonomic effects or totally unresponsive, flaccid, and areflexic   1b. LOC Questions 2-->Answers neither question correctly   1c. LOC Commands 2-->Performs neither task correctly   2.   Best Gaze 2-->Forced deviation, or total gaze paresis not overcome by the oculocephalic maneuver   3.   Visual 3-->Bilateral hemianopia (blind including cortical blindness)   4.   Facial Palsy 3-->Complete paralysis of one or both sides (absence of facial movement in the upper and lower face)   5a. Motor Arm, Left 2-->Some effort against gravity, limb cannot get to or maintain (if cued) 90 (or 45) degrees, drifts down to bed, but has some effort against gravity   5b. Motor Arm, Right 3-->No effort against gravity, limb falls   6a. Motor Leg, Left 3-->No effort  against gravity, leg falls to bed immediately   6b. Motor Leg, right 3-->No effort against gravity, leg falls to bed immediately   7.   Limb Ataxia 0-->Absent   8.   Sensory 0-->Normal, no sensory loss   9.   Best Language 3-->Mute, global aphasia, no usable speech or auditory comprehension   10. Dysarthria 2-->Severe dysarthria, patients speech is so slurred as to be unintelligible in the absence of or out of proportion to any dysphasia, or is mute/anarthric   11. Extinction and Inattention  1-->Visual, tactile, auditory, spatial, or personal inattention or extinction to bilateral simultaneous stimulation in one of the sensory modalities   Total 32 (02/20/23 1026)       Modified Glascock Score (Pre-morbid)  (S) 4 (memory care, DNR/DNI/comfort focus) - (S) Moderately severe disability.  Unable to attend to own bodily needs without assistance or unable to walk unassisted. (memory care, DNR/DNI/comfort focus)    Imaging  I personally reviewed all imaging; relevant findings per HPI.     Lab Results Data   CBC  Recent Labs   Lab 02/20/23  0937   WBC 9.3   RBC 4.52   HGB 12.0   HCT 39.8        Basic Metabolic Panel    Recent Labs   Lab 02/20/23  0937      POTASSIUM 3.6   CHLORIDE 104   CO2 28   BUN 16.9   CR 0.66   *   MONTY 9.1     Liver Panel  No results for input(s): PROTTOTAL, ALBUMIN, BILITOTAL, ALKPHOS, AST, ALT, BILIDIRECT in the last 168 hours.  INR    Recent Labs   Lab Test 02/20/23  0937   INR 0.95      Lipid Profile  No lab results found.  A1C  No lab results found.  Troponin    Recent Labs   Lab 02/20/23  0937   CTROPT 19*          Stroke Code Data Data   Stroke Code Data  (for stroke code without tele)  Stroke code activated 02/20/23   0916   First stroke provider response 02/20/23   0925   Last known normal 02/20/23   0800   Time of discovery   (or onset of symptoms) 02/20/23   0801   Head CT read by Stroke Neuro Dr/Provider 02/20/23   0928   Was stroke code de-escalated? (S) Yes (comfort)  02/20/23 1003            I personally examined and evaluated the patient today. At the time of my evaluation and management the patient was in critical condition today due to stroke code. I personally managed review of chart, medical record, meds, imaging, history, exam, and discussion with attending regarding plan and documentation. I spent a total of 70 minutes providing critical care services, evaluating the patient, directing care and reviewing laboratory values and radiologic reports.

## 2023-02-20 NOTE — ED TRIAGE NOTES
"Patient arrives via EMS from memory care unit.  Staff reported \"normal\" at breakfast table this morning and then found slumped on her toilet unresponsive.  Patient arrives in ED, respirations regular with good depth, extremities flaccid x4, nonverbal. Pupils equal, round, sluggish.     "

## 2023-02-20 NOTE — ED NOTES
"[facility]  ED Nurse Handoff Report    ED Chief complaint: Loss of Consciousness      ED Diagnosis:   Final diagnoses:   None       Code Status: Comfort Care    Allergies:   Allergies   Allergen Reactions    Codeine        Patient Story: Patient arrives via EMS from memory care unit.  Staff reported \"normal\" at breakfast table this morning and then found slumped on her toilet unresponsive.  Patient arrives in ED, respirations regular with good depth, extremities flaccid x4, nonverbal. Pupils equal, round, sluggish.   Focused Assessment:  patient is unresponsive. Doesn't appear to be in any pain at this time. Patient is full comfort cares     Treatments and/or interventions provided: comfort measures   Patient's response to treatments and/or interventions: comfort    To be done/followed up on inpatient unit:  comfort cares    Does this patient have any cognitive concerns?: Disoriented to time, Disoriented to place, Disoriented to situation, and Disorientation to person    Activity level - Baseline/Home:  assist of 1  Activity Level - Current:   Total Care    Patient's Preferred language: English   Needed?: No    Isolation: None  Infection: Not Applicable  Patient tested for COVID 19 prior to admission: YES  Bariatric?: No    Vital Signs:   Vitals:    02/20/23 0949 02/20/23 1004 02/20/23 1034 02/20/23 1104   BP: (!) 200/86 (!) 210/79 (!) 190/86 (!) 190/101   Pulse: 64 65 65 71   Resp: 17 13 16 18   Temp:       TempSrc:       SpO2: 92% 99% 99% 98%       Cardiac Rhythm:     Was the PSS-3 completed:   No - AMS  What interventions are required if any?               Family Comments: family is at bedside  OBS brochure/video discussed/provided to patient/family: N/A              Name of person given brochure if not patient:               Relationship to patient:     For the majority of the shift this patient's behavior was .   Behavioral interventions performed were .    ED NURSE PHONE NUMBER: " *[id number]

## 2023-02-20 NOTE — ED NOTES
Bed: [facility]  Expected date: 2/20/23  Expected time: 8:58 AM  Means of arrival: Ambulance  Comments:  [id number] stroke  ETA [phone]

## 2023-02-20 NOTE — PROGRESS NOTES
Nursing note  Pt arrived on the unit around 1545 from ED with family. Pt came up with diaper saturated with urine. Skin very dry and flaky. Washed pt up with HCA. Sween cream applied and diaper changed. Pt is unresponsive. Settled pt down and vital signs obtained. ./75 (BP Location: Right arm)   Pulse 80   Temp 99.9  F (37.7  C) (Axillary)   Resp 16   SpO2 92%  on RA. Pt throw up small clear emesis. T/R Q 2 hrs. Scattered bruises/ecchymosis. PIV x 2. Pt still withdraw from pain. Seems comfortable. No signs of discomfort noted. Will continue to monitor.Boyd Brink RN

## 2023-02-20 NOTE — PROGRESS NOTES
SPIRITUAL HEALTH SERVICES Progress Note  [facility] ED    Referral: Nursing request for family support - Ptnt on Comfort Cares.    Ptnt Vero was not responsive; I visited with several family members.    They shared stories about Vero, reminisced about family experiences, noted she had been Adventist and all family members were Lutheran.    Several family members were still being notified and expected to visit in the coming hours.    I offered reflective listening and suggestions for keeping weber, encouraged self-care, provided a prayer blanket, and said a prayer. I invited them to reach out to  as desired.     remains available.    Rev Lali Dill  Associate   [facility] Phone Line [phone]  [place] (Tuesdays & Thursdays) [phone]

## 2023-02-20 NOTE — PROGRESS NOTES
.RECEIVING UNIT ED HANDOFF REVIEW    ED Nurse Handoff Report was reviewed by: Boyd Brink RN on February 20, 2023 at 3:25 PM

## 2023-02-20 NOTE — H&P
[facility]    History and Physical - Hospitalist Service       Date of Admission:  2/20/2023    Assessment & Plan      Vero Mcfarland is a 89 year old female admitted on 2/20/2023. She sustained a brain injury today and is now, unresponsive. Family members have chosen comfort care for this patient. Son expressed interest in having patient return to her previous living facility if her care needs could be met there.    Comfort Care    Bedrest    NPO    Morphine prn comfort    Lorazepam prn agitation    Atropine prn secretion management    Routine care: skin care, oral hygiene    VS and oximetry per protocol     Diet: NPO for Medical/Clinical Reasons Except for: No Exceptions    DVT Prophylaxis: Comfort Care  Jones Catheter: Not present  Lines: None     Cardiac Monitoring: None  Code Status:   DNR/DNI    Disposition Plan      Expected Discharge Date: 02/22/2023                The patient's care was discussed with the Attending Physician, Dr. Chapman and Patient's Family.    Ibeth Ulrich, NP  Hospitalist Service  [facility]  Securely message with [employer] (more info)  Text page via [employer] Paging/Directory     ______________________________________________________________________    Chief Complaint   Unresponsive    History is obtained from the electronic health record, emergency department physician and patient's family    History of Present Illness   Vero Mcfarland is a 89 year old female with a history of coronary artery disease, hypertension, hyperlipidemia, and thyroid cancer s/p thyroidectomy who presents with loss of consciousness. Patient presents to the ED via EMS, coming from [facility] living [facility] where she lives in the memory care unit. Per EMS, staff say she had breakfast this morning, and then went to her room around 0815 (this is her last known well time) where she was found at around 0830 by staff slumped over on the toilet  breathing but unresponsive, having had a bowel movement. Per EMS, blood sugar on arrival was 125, oxygen saturation on room air was 92%. Patient is DNR/DNI.    On arrival to the ED she was unresponsive and has continued to be. She has no history of recent illness and has been receiving medications as appropriate for her medical history. She was evaluated by Neurology who reviewed her head CT and had a discussion by phone with her daughter, Keyana Fishman who is her executor. Keyana confirmed the patient was to be on comfort focused care only. I spoke with her son, Wilner Mcfarland by phone and did confirm this plan of care with him as well.    Past Medical History:    Anxiety  Vertigo  Visual loss, right eye  Hyperlipidemia  IBS  polychlonal hypergammaglobulinemia  nonrheumatic mitral valve insufficiency  nonrheumatic aortic valve insufficiency  Hypertension  CAD  Senile macular degeneration of retina  Glaucoma  Thyroid cancer   SBO   Pleurisy      Past Surgical History:    Thyroidectomy  Hysterectomy  Cholecystectomy  Trigger finger release  CABG  ANURADHA and BSO  Release of intestinal adhesions      Prior to Admission Medications     Lexapro  Buspar  Lipitor  Norvasc  Zestril  Toprol  Synthroid  Oretic     Review of Systems    Review of systems not obtained due to patient factors - mental status deficits    Physical Exam   Vital Signs: Temp: 97.6  F (36.4  C) Temp src: Oral BP: (!) 200/86 Pulse: 65   Resp: 18 SpO2: 95 % O2 Device: Nasal cannula Oxygen Delivery: 5 LPM  Weight: 0 lbs 0 oz    General Appearance: unresponsive  Eyes: pupils 3 mm, non-reactive  HEENT: normocephalic, atraumatic; nose and throat clear  Respiratory: chest symmetric with easy respirations bilaterally  Cardiovascular: normal S1/S2 with grade III systolic murmur  GI: soft, non-distended; no reaction with palpation. Hypoactive bowel sounds in all quadrants.  Skin: warm and dry  Musculoskeletal: no movement right side; some movement of left  fingers-not purposeful.  Neurologic: unresponsive.      Medical Decision Making   Patient wishes established prior to this day were DNR/DNI; family (daughter and son) did choose comfort care for this patient.     Data     I have personally reviewed the following data over the past 24 hrs:    9.3  \   12.0   / 273     143 104 16.9 /  144 (H)   3.6 28 0.66 \       Trop: 19 (H) BNP: N/A       INR:  0.95 PTT:  26   D-dimer:  N/A Fibrinogen:  N/A       Imaging results reviewed over the past 24 hrs:   Recent Results (from the past 24 hour(s))   CT Head w/o Contrast    Narrative    CT SCAN OF THE HEAD WITHOUT CONTRAST February 20, 2023 9:25 AM     HISTORY: Code stroke.    TECHNIQUE: Axial images of the head and coronal reformations without  IV contrast material. Radiation dose for this scan was reduced using  automated exposure control, adjustment of the mA and/or kV according  to patient size, or iterative reconstruction technique.    COMPARISON: None.    FINDINGS: The ventricles appear proportionate to the sulci. No  definite hydrocephalus. There is moderate generalized brain  parenchymal volume loss. There appears to be probable chronic infarct  involving the left inferior frontal gyrus. Small focus of  hypoattenuation in the right corona radiata (series 3 image 19)  concerning for age-indeterminate ischemic change. Elsewhere, moderate  patchy and confluent nonspecific hypoattenuation in the cerebral white  matter which likely represents sequela of chronic small vessel  ischemic disease. Small focus of hypoattenuation in the right  cerebellar hemisphere (series 3 image 6) concerning for  age-indeterminate infarct, potentially chronic. No acute intracranial  hemorrhage, extra-axial fluid collection, or mass effect/herniation.  Scattered atherosclerotic calcifications.    Bilateral lens implants. Visualized orbits otherwise appear normal.  Visualized aspects of the paranasal sinuses, mastoids, and middle ear  cavities  appear clear. The bony calvarium and bones of the skull base  appear intact. Bilateral temporomandibular joint degenerative changes.      Impression    IMPRESSION:  1. No acute intrarenal hemorrhage, extraaxial fluid collection, or  mass effect.  2. Chronic infarct in the left inferior frontal gyrus.  3. Small foci of probable age-indeterminate ischemic change in the  right corona radiata and right cerebellar hemisphere. Consider MRI for  further evaluation.  4. Brain atrophy and presumed chronic small vessel ischemic changes,  as described.   CTA Head Neck w Contrast    Narrative    CT ANGIOGRAM OF THE HEAD AND NECK WITH CONTRAST February 20, 2023 9:26  AM     HISTORY: Code stroke.     TECHNIQUE: CT angiography with an injection of 75 Isovue-370 IV with  scans through the head and neck. Images were transferred to a separate  3-D workstation where multiplanar reformations and 3-D images were  created. Estimates of carotid stenoses are made relative to the distal  internal carotid artery diameters except as noted. Radiation dose for  this scan was reduced using automated exposure control, adjustment of  the mA and/or kV according to patient size, or iterative  reconstruction technique.      COMPARISON: CT head of same day.     CT ANGIOGRAM HEAD FINDINGS: There is atherosclerosis of the bilateral  carotid siphons, without flow-limiting stenosis. There appears to be  developmental absence or marked hypoplasia of the A1 segment of the  right anterior cerebral artery. There is a dominant left anterior  cerebral artery that appears to primarily supply both anterior  cerebral artery territories. There is occlusion of the left anterior  cerebral artery in the A2 segment region, near the level of the genu  of the corpus callosum (series 9 image 466).    There is occlusion of the dominant proximal superior division M2  branch of the left middle cerebral artery. There is poor collateral  supply to the left MCA territory. There  is poor collateral supply to  the left TIFFANIE territory. The high paramedian right frontal lobe appears  to be supplied by a small arterial branch arising directly from the A1  segment of the left anterior cerebral artery. There is some collateral  vascular enhancement seen in the right pericallosal region.    The major proximal branches of the right middle cerebral artery appear  to be patent. The bilateral vertebral arteries, the basilar artery,  and the proximal branches of the posterior cerebral arteries are  patent. No intracranial aneurysm or high flow vascular malformation is  identified.    CT ANGIOGRAM NECK FINDINGS: Atherosclerosis of the aortic arch is  noted. There is mild to moderate focal [________] at the origin of the  left subclavian artery. Otherwise, mild atherosclerosis of the origins  of the brachiocephalic and left common carotid arteries without  significant stenosis. Conventional three-vessel aortic arch branching  pattern.    Right carotid artery: The right common and internal carotid arteries  are patent. Mild atherosclerotic disease at the carotid bifurcation  and proximal internal carotid artery without significant stenosis by  NASCET criteria.     Left carotid artery: The left common and internal carotid arteries are  patent. Mild atherosclerosis of the left carotid bifurcation with less  than 50% stenosis by NASCET criteria.     Vertebral arteries: Vertebral arteries are patent without evidence of  dissection. No significant stenosis.     Other findings: Marked atrophy or absence of the thyroid gland.  Findings of previous median sternotomy noted. There is a small  retention cyst or polyp in the posterior right ethmoid region. Small  amount of aerated secretions in the left sphenoid sinus. There are  presumed areas of fibrosis in the lung apices bilaterally.  Degenerative changes noted in the mid to lower cervical spine and in  the partially visualized thoracic spine.      Impression     IMPRESSION:  1. Occlusion of the proximal dominant superior division M2 branch of  the left middle cerebral artery. Poor collateral supply in the left  MCA superior division territory.  2. 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 Occlusion of a predominantly azygos configuration dominant  anterior cerebral artery at the distal A2 segment with poor collateral  supply in the left TIFFANIE territory. There is some collateral  flow/vascular supply in the right TIFFANIE territory, although the  bilateral TIFFANIE territories are at risk for infarction.  3. Mild atherosclerosis in the carotid siphons without significant  stenosis.  4. Bilateral carotid bifurcation atherosclerosis with less than 50%  stenosis by NASCET criteria.  5. Patent bilateral cervical vertebral arteries.    Findings from the noncontrast head CT and CTA head and neck were  discussed by myself with Dr. Robles at 9:40 AM on 2/20/2023.

## 2023-02-20 NOTE — ED NOTES
Neuro stroke team here and spoke with family via phone and determined that patient is to receive no heroic measures and to be made comfortable.

## 2023-02-21 NOTE — PROGRESS NOTES
[facility]    Medicine Progress Note - Hospitalist Service    Date of Admission:  2/20/2023    Assessment & Plan     Vero Mcfarland is a 89 year old female admitted on 2/20/2023. She sustained a brain injury today and is now, unresponsive. Family members have chosen comfort care for this patient. Son expressed interest in having patient return to her previous living facility if her care needs could be met there.     Comfort Care  ? Bedrest  ? NPO  ? Morphine prn comfort  ? Lorazepam prn agitation  ? Atropine prn secretion management  ? Routine care: skin care, oral hygiene  ? VS and oximetry per protocol       Diet: NPO for Medical/Clinical Reasons Except for: No Exceptions    DVT Prophylaxis: None  Jones Catheter: Not present  Lines: None     Cardiac Monitoring: None  Code Status: No CPR- Do NOT Intubate      Clinically Significant Risk Factors Present on Admission                    # Acute Respiratory Failure: Documented O2 saturation < 91%.  Continue supplemental oxygen as needed             Disposition Plan      Expected Discharge Date: 02/21/2023                The patient's care was discussed with the Attending Physician, Dr. Carrington and Bedside Nurse.    Ibeth Ulrich NP  Hospitalist Service  [facility]  Securely message with [employer] (more info)  Text page via [employer] Paging/Directory   ______________________________________________________________________    Interval History   Patient has remained unresponsive and is incontinent. She was given medication for pain and agitation during the night. VSS, afebrile.    Physical Exam   Vital Signs: Temp: 99.9  F (37.7  C) Temp src: Axillary BP: 115/75 Pulse: 80   Resp: 22 SpO2: 92 % O2 Device: None (Room air) Oxygen Delivery: 5 LPM  Weight: 0 lbs 0 oz  General Appearance: unresponsive  Eyes: pupils 3 mm, non-reactive  HEENT: normocephalic, atraumatic; nose and throat clear  Respiratory: chest symmetric  with easy respirations bilaterally  Cardiovascular: normal S1/S2 with grade III systolic murmur  GI: soft, non-distended; no reaction with palpation. Hypoactive bowel sounds in all quadrants.  Skin: warm and dry  Musculoskeletal: no movement right side; some movement of left fingers-not purposeful.  Neurologic: unresponsive.    Medical Decision Making       15 MINUTES SPENT BY ME on the date of service doing chart review, history, exam, documentation & further activities per the note.      Data         Imaging results reviewed over the past 24 hrs:   No results found for this or any previous visit (from the past 24 hour(s)).

## 2023-02-21 NOTE — PLAN OF CARE
[id number]  Pt is comfort care, VS and formal assessment deferred. Pt is unresponsive. Incontinent of urine, purewick in place. Strict NPO. PRN ativan and roxanol given x 1. 2 PIV, saline locked.

## 2023-02-22 NOTE — PLAN OF CARE
Goal Outcome Evaluation:    Comfort care pt, VS and full assessment deferred. Pt unresponsive, FORTUNATO orientation. A2, T/R Q2. Respirations 18-24 bpm overnight and occasional moans w/ turning, managed w/ PRN roxanol x2 and fan. Mild gurgling/secretions noted, managed w/ PRN atropine x2. Oral cares provided. Purewick in place w/ low UOP, no BM. Scattered bruising to extremities. PIV SL x2. Discharge pending.

## 2023-02-22 NOTE — DISCHARGE SUMMARY
[facility]  Hospitalist Discharge Summary      Date of Admission:  2/20/2023  Date of Discharge:  2/22/2023  Discharging Provider: Ibeth Ulrich NP  Discharge Service: Hospitalist Service    Discharge Diagnoses   CVA    Follow-ups Needed After Discharge   No follow up required. Will be transferred to hospice care.     Unresulted Labs Ordered in the Past 30 Days of this Admission     No orders found from 1/21/2023 to 2/21/2023.          Discharge Disposition   Admited to hospice care.   Agency: [facility].  Condition at discharge: Terminal    Hospital Course   Vero Mcfarland is a 89 year old female admitted on 2/20/2023. She sustained a brain injury today and is now, unresponsive. Family members have chosen comfort care for this patient. Patient will be transferred to hospice.     Consultations This Hospital Stay   CARE MANAGEMENT / SOCIAL WORK IP CONSULT  [facility]IENT [facility] ADULT CONSULT  [facility] INPATIENT HOSPICE ADULT CONSULT    Code Status   No CPR- Do NOT Intubate    Time Spent on this Encounter   I, Ibeth Ulrich NP, personally saw the patient today and spent less than or equal to 30 minutes discharging this patient.       Ibeth Ulrich NP  [place] [address] [facility]  [address]  [facility] [address]  Phone: [phone]  ______________________________________________________________________    Physical Exam   Vital Signs:           Resp: 25        Weight: 0 lbs 0 oz  General Appearance: unresponsive  Eyes: pupils 3 mm, non-reactive  HEENT: normocephalic, atraumatic; nose and throat clear  Respiratory: chest symmetric with easy respirations bilaterally; lung sounds clear and equal.   Cardiovascular: normal S1/S2 with grade III systolic murmur  GI: soft, non-distended; no reaction with palpation. Hypoactive bowel sounds in all quadrants.  Skin: warm and dry  Musculoskeletal: no movement right side; some movement of left fingers-not  purposeful.  Neurologic: unresponsive.       Primary Care Physician   Physician No Ref-Primary    Discharge Orders   No discharge procedures on file.    Significant Results and Procedures   Most Recent 3 CBC's:Recent Labs   Lab Test 02/20/23  0937   WBC 9.3   HGB 12.0   MCV 88        Most Recent 3 BMP's:Recent Labs   Lab Test 02/20/23  0937      POTASSIUM 3.6   CHLORIDE 104   CO2 28   BUN 16.9   CR 0.66   ANIONGAP 11   MONTY 9.1   *   ,   Results for orders placed or performed during the hospital encounter of 02/20/23   CTA Head Neck w Contrast    Narrative    CT ANGIOGRAM OF THE HEAD AND NECK WITH CONTRAST February 20, 2023 9:26  AM     HISTORY: Code stroke.     TECHNIQUE: CT angiography with an injection of 75 Isovue-370 IV with  scans through the head and neck. Images were transferred to a separate  3-D workstation where multiplanar reformations and 3-D images were  created. Estimates of carotid stenoses are made relative to the distal  internal carotid artery diameters except as noted. Radiation dose for  this scan was reduced using automated exposure control, adjustment of  the mA and/or kV according to patient size, or iterative  reconstruction technique.      COMPARISON: CT head of same day.     CT ANGIOGRAM HEAD FINDINGS: There is atherosclerosis of the bilateral  carotid siphons, without flow-limiting stenosis. There appears to be  developmental absence or marked hypoplasia of the A1 segment of the  right anterior cerebral artery. There is a dominant left anterior  cerebral artery that appears to primarily supply both anterior  cerebral artery territories. There is occlusion of the left anterior  cerebral artery in the A2 segment region, near the level of the genu  of the corpus callosum (series 9 image 466).    There is occlusion of the dominant proximal superior division M2  branch of the left middle cerebral artery. There is poor collateral  supply to the left MCA territory. There is  poor collateral supply to  the left TIFFANIE territory. The high paramedian right frontal lobe appears  to be supplied by a small arterial branch arising directly from the A1  segment of the left anterior cerebral artery. There is some collateral  vascular enhancement seen in the right pericallosal region.    The major proximal branches of the right middle cerebral artery appear  to be patent. The bilateral vertebral arteries, the basilar artery,  and the proximal branches of the posterior cerebral arteries are  patent. No intracranial aneurysm or high flow vascular malformation is  identified.    CT ANGIOGRAM NECK FINDINGS: Atherosclerosis of the aortic arch is  noted. There is mild to moderate focal stenosis at the origin of the  left subclavian artery. Otherwise, mild atherosclerosis of the origins  of the brachiocephalic and left common carotid arteries without  significant stenosis. Conventional three-vessel aortic arch branching  pattern.    Right carotid artery: The right common and internal carotid arteries  are patent. Mild atherosclerotic disease at the carotid bifurcation  and proximal internal carotid artery without significant stenosis by  NASCET criteria.     Left carotid artery: The left common and internal carotid arteries are  patent. Mild atherosclerosis of the left carotid bifurcation with less  than 50% stenosis by NASCET criteria.     Vertebral arteries: Vertebral arteries are patent without evidence of  dissection. No significant stenosis.     Other findings: Marked atrophy or absence of the thyroid gland.  Findings of previous median sternotomy noted. There is a small  retention cyst or polyp in the posterior right ethmoid region. Small  amount of aerated secretions in the left sphenoid sinus. There are  presumed areas of fibrosis in the lung apices bilaterally.  Degenerative changes noted in the mid to lower cervical spine and in  the partially visualized thoracic spine.      Impression     IMPRESSION:  1. Occlusion of the proximal dominant superior division M2 branch of  the left middle cerebral artery. Poor collateral supply in the left  MCA superior division territory.  2. 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 Occlusion of a predominantly azygos configuration dominant  anterior cerebral artery at the distal A2 segment with poor collateral  supply in the left TIFFANIE territory. There is some collateral  flow/vascular supply in the right TIFFANIE territory, although the  bilateral TIFFANIE territories are at risk for infarction.  3. Mild atherosclerosis in the carotid siphons without significant  stenosis.  4. Bilateral carotid bifurcation atherosclerosis with less than 50%  stenosis by NASCET criteria.  5. Patent bilateral cervical vertebral arteries.    Findings from the noncontrast head CT and CTA head and neck were  discussed by myself with Dr. Robles at 9:40 AM on 2/20/2023.     SURENDRA CARRASQUILLO MD         SYSTEM ID:  [id number]   CT Head w/o Contrast    Narrative    CT SCAN OF THE HEAD WITHOUT CONTRAST February 20, 2023 9:25 AM     HISTORY: Code stroke.    TECHNIQUE: Axial images of the head and coronal reformations without  IV contrast material. Radiation dose for this scan was reduced using  automated exposure control, adjustment of the mA and/or kV according  to patient size, or iterative reconstruction technique.    COMPARISON: None.    FINDINGS: The ventricles appear proportionate to the sulci. No  definite hydrocephalus. There is moderate generalized brain  parenchymal volume loss. There appears to be probable chronic infarct  involving the left inferior frontal gyrus. Small focus of  hypoattenuation in the right corona radiata (series 3 image 19)  concerning for age-indeterminate ischemic change. Elsewhere, moderate  patchy and confluent nonspecific hypoattenuation in the cerebral white  matter which likely represents sequela of chronic small vessel  ischemic disease.  Small focus of hypoattenuation in the right  cerebellar hemisphere (series 3 image 6) concerning for  age-indeterminate infarct, potentially chronic. No acute intracranial  hemorrhage, extra-axial fluid collection, or mass effect/herniation.  Scattered atherosclerotic calcifications.    Bilateral lens implants. Visualized orbits otherwise appear normal.  Visualized aspects of the paranasal sinuses, mastoids, and middle ear  cavities appear clear. The bony calvarium and bones of the skull base  appear intact. Bilateral temporomandibular joint degenerative changes.      Impression    IMPRESSION:  1. No acute intrarenal hemorrhage, extraaxial fluid collection, or  mass effect.  2. Chronic infarct in the left inferior frontal gyrus.  3. Small foci of probable age-indeterminate ischemic change in the  right corona radiata and right cerebellar hemisphere. Consider MRI for  further evaluation.  4. Brain atrophy and presumed chronic small vessel ischemic changes,  as described.    SURENDRA CARRASQUILLO MD         SYSTEM ID:  [id number]       Discharge Medications   There are no discharge medications for this patient.    Allergies   Allergies   Allergen Reactions     Codeine

## 2023-02-22 NOTE — PLAN OF CARE
Goal Outcome Evaluation:    Comfort cares continued, VS and assessment deferred. FORTUNATO orientation, pt unresponsive. A2 and L, T/R q 2 hrs. Occasionally moaning with repositioning, PRN roxanol given 1x. NPO, oral cares continued. PRN atropine given for secretions/gurgling. PIV x2 SL. Purewick in place for incontinence. Bruising scattered throughout. Family visited today.

## 2023-02-22 NOTE — PROGRESS NOTES
[facility]    Medicine Progress Note - Hospitalist Service    Date of Admission:  2/20/2023    Assessment & Plan     Vero Mcfarland is a 89 year old female admitted on 2/20/2023. She sustained a brain injury today and is now, unresponsive. Family members have chosen comfort care for this patient. Son expressed interest in having patient return to her previous living facility if her care needs could be met there.     Comfort Care  ? Bedrest  ? NPO  ? Morphine prn comfort  ? Lorazepam prn agitation  ? Atropine prn secretion management  ? Routine care: skin care, oral hygiene  ? VS and oximetry per protocol  ? [facility] evaluation in process.       Diet: NPO for Medical/Clinical Reasons Except for: No Exceptions    DVT Prophylaxis: None  Jones Catheter: Not present  Lines: None     Cardiac Monitoring: None  Code Status: No CPR- Do NOT Intubate      Clinically Significant Risk Factors                                Disposition Plan      Expected Discharge Date: 02/24/2023    Discharge Delays: Comfort Care/Hospice            The patient's care was discussed with the Attending Physician, Dr. Carrington, Bedside Nurse and [facility].    Ibeth Ulrich, NP  Hospitalist Service  [facility]  Securely message with [employer] (more info)  Text page via [employer] Paging/Directory   ______________________________________________________________________    Interval History   Remains unresponsive and incontinent. She was given medication for pain and agitation as needed. VSS, afebrile.     Physical Exam   Vital Signs:           Resp: 25        Weight: 0 lbs 0 oz    General Appearance: unresponsive  Eyes: pupils 3 mm, non-reactive  HEENT: normocephalic, atraumatic; nose and throat clear  Respiratory: chest symmetric with easy respirations bilaterally; lung sounds clear and equal.   Cardiovascular: normal S1/S2 with grade III systolic murmur  GI: soft, non-distended; no reaction  with palpation. Hypoactive bowel sounds in all quadrants.  Skin: warm and dry  Musculoskeletal: no movement right side; some movement of left fingers-not purposeful.  Neurologic: unresponsive.    Medical Decision Making       30 MINUTES SPENT BY ME on the date of service doing chart review, history, exam, documentation & further activities per the note.      Data         Imaging results reviewed over the past 24 hrs:   No results found for this or any previous visit (from the past 24 hour(s)).

## 2023-02-22 NOTE — PROGRESS NOTES
Referral Received - [facility]       [facility] would like to thank you for the [facility] referral.    Referral received and initial insurance information sent to  Hospice intake for review.    We are determining your patient's eligibility with a medical director at this time.    Our plan is to visit your patient as soon as possible. We will connect with the primary care team shortly to collect more information on the patient's progression. Thank you for your patience.      Mikayla Fair, ROBB   [facility]  Contact information available via [facility] Paging/Directory     Listed as Hospice [facility] in [facility]

## 2023-02-22 NOTE — H&P
[facility]    History and Physical - Hospitalist Service       Date of Admission:  2/20/2023    Assessment & Plan      Vero Mcfarland is a 89 year old female admitted on 2/20/2023. She will be receiving hospice care secondary to a significant CVA. Family (daughter and son) are in agreement with this plan.    Comfort Care  ? Bedrest  ? NPO  ? Morphine prn comfort  ? Lorazepam prn agitation  ? Atropine prn secretion management  ? Routine care: skin care, oral hygiene  ? VS and oximetry per protocol  ? [facility] evaluation in process.       Diet:   NPO  DVT Prophylaxis: None  Jones Catheter: Not present  Lines: None     Cardiac Monitoring: None  Code Status: No CPR- Do NOT Intubate      Clinically Significant Risk Factors                                Disposition Plan      Expected Discharge Date: 02/23/2023    Discharge Delays: Comfort Care/Hospice            The patient's care was discussed with the Attending Physician, Dr. Carrington.    Ibeth Ulrich, NP  Hospitalist Service  [facility]  Securely message with [employer] (more info)  Text page via [facility] Paging/Directory     ______________________________________________________________________    Chief Complaint   Hospice Care related to Stroke    History is obtained from the patient record.    History of Present Illness   Vero Mcfarland is a 89 year old female who was admitted to the hospital from the ED on 02/20 for management of a stroke. She was in her usual state of health until that day when she was found slumped over and unresponsive while on the toilet.       Past Medical History    No past medical history on file.    Past Surgical History   No past surgical history on file.    Prior to Admission Medications   None        Review of Systems    Patient unresponsive.      Physical Exam   Vital Signs:           Resp: 25        Weight: 0 lbs 0 oz    General Appearance: unresponsive  Eyes: pupils 3 mm,  non-reactive  HEENT: normocephalic, atraumatic; nose and throat clear  Respiratory: chest symmetric with easy respirations bilaterally; lung sounds clear and equal.   Cardiovascular: normal S1/S2 with grade III systolic murmur  GI: soft, non-distended; no reaction with palpation. Hypoactive bowel sounds in all quadrants.  Skin: warm and dry  Musculoskeletal: no movement right side; some movement of left fingers-not purposeful.  Neurologic: unresponsive.    Medical Decision Making     30 MINUTES SPENT BY ME on the date of service doing chart review, history, exam, documentation & further activities per the note.      Data         Imaging results reviewed over the past 24 hrs:   No results found for this or any previous visit (from the past 24 hour(s)).

## 2023-02-23 PROBLEM — I63.9 STROKE (H): Status: ACTIVE | Noted: 2023-01-01

## 2023-02-23 NOTE — PROGRESS NOTES
[facility]    Medicine Progress Note - Hospitalist Service    Date of Admission: 2/23/23    Assessment & Plan   Vero Mcfarland is a 89 year old female with hx of CAD, HLD, HTN, AV insufficieny, polyclonal hypergammaglobulinemia, vertigo, muscular degeneration who presented 2/20/2023 after being found unresponsive. In the ED found to have L TIFFANIE/MCA territory ischemic infarct due to L proximal M2 occlusion with poor collaterals and L A2 occlusion who transitioned to comfort focused cares after discussion with family. Transitioned to inpatient hospice 2/23/23.      Comfort Care  ? Bedrest  ? NPO  ? Morphine IV or PRN prn comfort, dyspnea  ? Lorazepam prn agitation  ? Atropine prn secretion management  ? Robinul IV for secretions   ? Routine care: skin care, oral hygiene  ? VS and oximetry per protocol  ? [facility] following, appreciate assistance       Diet:  NPO   DVT Prophylaxis: VTE Prophylaxis contraindicated due to hospice, comfort focused care  Jones Catheter: Not present  Lines: None     Cardiac Monitoring: None  Code Status: No CPR- Do NOT Intubate     Clinically Significant Risk Factors Present on Admission                               Disposition Plan      Expected Discharge Date: 02/25/2023                The patient's care was discussed with the Attending Physician, Dr. Child, Bedside Nurse. Attempted to call patients son, Wilner, with no answer.     JOCE Ladd  Hospitalist Service  [facility]  Securely message with [employer] (more info)  Text page via [facility] Paging/Directory   ______________________________________________________________________    Interval History   No acute events overnight. Patient unable to provide history. Appears comfortable.     Discussed with nursing, requested addition of PO morphine for pain relief.     Physical Exam   There were no vitals taken for this visit.  GENERAL: Eye closed, unresponsive to voice and gentle  touch. Diaphoretic.   HEENT: Mucous membranes moist and without lesions.   CV: Tachycardic.   RESPIRATORY: Breathing non labored.   NEUROLOGICAL: Unresponsive.   EXTREMITIES: extremity warm.  SKIN: No jaundice. No rashes.        Medical Decision Making     30 MINUTES SPENT BY ME on the date of service doing chart review, history, exam, documentation & further activities per the note.

## 2023-02-23 NOTE — CONSULTS
[facility]    Progress Note - [facility]    ______________________________________________________________________    [facility]  Contact Number: [phone]    - Providers: Please contact [facility] with changes in orders or clinical plan of care   - Nursing: Please contact [facility] with significant changes in patient condition  ______________________________________________________________________        Plan of Care Discussed with the Following:   - Nurse: YONNY Otto  - Hospitalist/Rounding Provider: Ibeth Ulrich NP- reviewed note. Page out to Dr. Charissa Pham's Family/Preferred Contact: Wilner Mcfarland, son.     Summary of Visit (includes assessment, medications and any new orders):     Writer visited patient at bedside. Patient with eyes closed, mouth-breathing, unresponsive to voice and gentle sternal rub. Bedside RN, Clarita, present and providing robinul IV for secretions. Patient with warm extremities, bruising to right forearm. Pulse 108 and bounding. Diaphoretic.  Per notes patient had been moaning with repositioning. Recommend pre-medicating for pain prior to repositioning.  Morphine 10 mg=0.5 mL is available every 1 hour PRN.    Spoke with Wilner Mcfarland, patient's son, at [phone], who relays he may visit hospital later today. Wilner rebeccas  home of choice is:  [facility]  Home  [address], [place] [address]  [phone]    Wilner states that prior arrangements have been made, and  home staff plans to drive to [place] at time of death.     Writer received request from Hospice ROBB Fair, who relays family would like Sacrament performed. Writer placed page to on-call [facility] pager @ [phone]. Per chaplain Arpita, there was a  at hospital this morning.       Raysa Lemos RN

## 2023-02-23 NOTE — SIGNIFICANT EVENT
SPIRITUAL HEALTH SERVICES Significant Event  [facility]ith Sacrament of ANOINTING  [facility]    Pt anointed by Father Ricky Manley per request of family.      Mikayla Benjamin  Associate

## 2023-02-23 NOTE — PLAN OF CARE
Goal Outcome Evaluation:    Shift [id number]    [facility] hospice, VS and full assessment deferred. FORTUNATO orientation, pt is unresponsive. A+2 with lift, T/R q 2 hours. Has occasional discomfort with repositioning, Roxanol given x1. Is NPO and requires frequent oral cares due to secretions. Incontinent of B/B, has purewick in place w/ low UOP. Had no BM this shift. Has bruises scattered on upper extremities. PIV on left and right arm and SL. Family visited at bedside.

## 2023-02-23 NOTE — PROGRESS NOTES
SPIRITUAL HEALTH SERVICES  SPIRITUAL ASSESSMENT Progress Note  [facility]    REFERRAL SOURCE: family request for anointing    Facilitated connection with on-call Fr. Ricky garcia, who was able to come to provide sacrament of anointing.    PLAN: Spiritual Health remains available per need/request.    Mikayla Benjamin  Associate   [facility] Phone Line: [phone]

## 2023-02-23 NOTE — PLAN OF CARE
Goal Outcome Evaluation:    [facility] hosp[facility] pt, VS and full assessment deferred. Pt unresponsive, FORTUNATO orientation. A2, T/R Q2. PRN morphine effective for comfort during turns/cares. RR continues to remain 20-26 bpm but pt appears comfortable. PRN atropine utilized for mild secretion mgmt. Oral cares provided. Purewick in place w/ minimal jatin UOP, no BM. Scattered bruising to bilateral extremities. PIV SL x2. Discharge pending.

## 2023-02-24 NOTE — DEATH PRONOUNCEMENT
NP DEATH PRONOUNCEMENT    Called to pronounce Vero Mcfarland dead.    Physical Exam: Spontaneous respirations absent, Breath sounds absent, Carotid pulse absent and Heart sounds absent    Patient was pronounced dead at 11:35 AM, 2023.    Preliminary Cause of Death: cardiopulmonary arrest in the setting of comfort cares due unresponsiveness from to acute large TIFFANIE/MCA territory ischemic infarct due to L proximal M2 occlusion with poor collaterals and L A2 occlusion.     Principal Problem:    Stroke (H)       Infectious disease present?: NO    Communicable disease present? (examples: HIV, chicken pox, TB, Ebola, CJD) :  NO    Multi-drug resistant organism present? (example: MRSA): NO    Please consider an autopsy if any of the following exist:  NO Unexpected or unexplained death during or following any dental, medical, or surgical diagnostic treatment procedures.   NO Death of mother at or up to seven days after delivery.     NO All  and pediatric deaths.     NO Death where the cause is sufficiently obscure to delay completion of the death certificate.   NO Deaths in which autopsy would confirm a suspected illness/condition that would affect surviving family members or recipients of transplanted organs.     The following deaths must be reported to the 's Office:  NO A death that may be due entirely or in part to any factors other than natural disease (recent surgery, recent trauma, suspected abuse/neglect).   NO A death that may be an accident, suicide, or homicide.     NO Any sudden, unexpected death in which there is no prior history of significant heart disease or any other condition associated with sudden death.   NO A death under suspicious, unusual, or unexpected circumstances.    NO Any death which is apparently due to natural causes but in which the  does not have a personal physician familiar with the patient s medical history, social, or environmental situation or the  circumstances of the terminal event.   NO Any death apparently due to Sudden Infant Death Syndrome.     NO Deaths that occur during, in association with, or as consequences of a diagnostic, therapeutic, or anesthetic procedure.   NO Any death in which a fracture of a major bone has occurred within the past (6) six months.   NO A death of persons note seen by their physician within 120 days of demise.     NO Any death in which the  was an inmate of a public institution or was in the custody of Law Enforcement personnel.   NO  All unexpected deaths of children   NO Solid organ donors   NO Unidentified bodies   YES Deaths of persons whose bodies are to be cremated or otherwise disposed of so that the bodies will later be unavailable for examination;   NO Deaths unattended by a physician outside of a licensed healthcare facility or licensed residential hospice program   NO Deaths occurring within 24 hours of arrival to a health care facility if death is unexpected.    NO Deaths associated with the decedent s employment.   NO Deaths attributed to acts of terrorism.   NO Any death in which there is uncertainty as to whether it is a medical examiner s care should be discussed with the medical investigator.        Body disposition: Autopsy was discussed with family member:  Sj Sanchez in person.  Permission for autopsy was declined.    Srinath Canada NP  [facility]  Securely message with the Vocera Web Console (learn more here)  Text page via [employer] Paging/Directory

## 2023-02-24 NOTE — PLAN OF CARE
Goal Outcome Evaluation:    Shift Note: [id number]    [facility], VS and full assessment deferred. FORTUNATO orientation, pt is unresponsive. A+2 with lift, T/R q 2 hours. PRN oral Roxanol was added and given to pt x2. Scheduled robinul also added for secretions. Incontinent of B/B w/ little to no UOP, no BM this shift. Scattered bruising on upper and lower extremities. PIV on left and right are SL. [facility] hospice came to see today.

## 2023-02-24 NOTE — PROGRESS NOTES
[facility]    Medicine Progress Note - Hospitalist Service    Date of Admission:  2/23/2023    Assessment & Plan   Vero Mcfarland is a 89 year old female with hx of CAD, HLD, HTN, AV insufficieny, polyclonal hypergammaglobulinemia, vertigo, muscular degeneration who presented 2/20/2023 after being found unresponsive. In the ED found to have L TIFFANIE/MCA territory ischemic infarct due to L proximal M2 occlusion with poor collaterals and L A2 occlusion who transitioned to comfort focused cares after discussion with family. Transitioned to inpatient hospice 2/23/23.      Comfort Care  ? Bedrest  ? NPO  ? Morphine IV or PO solution prn for comfort, dyspnea  ? Lorazepam prn agitation  ? Atropine prn secretion management  ? Robinul IV for secretions   ? Routine care: skin care, oral hygiene  ? VS and oximetry per protocol  ? Hernandez catheter for urinary retention   ? [facility] following, appreciate assistance          Diet:  NPO   DVT Prophylaxis: VTE Prophylaxis contraindicated due to hospice care  Hernandez Catheter: Not present  Lines: None     Cardiac Monitoring: None  Code Status: No CPR- Do NOT Intubate      Clinically Significant Risk Factors Present on Admission                               Disposition Plan      Expected Discharge Date: 02/25/2023                The patient's care was discussed with the Attending Physician, Dr. Child, Bedside Nurse, Patient and Patient's Family.    JOCE Ladd  Hospitalist Service  [facility]  Securely message with [employer] (more info)  Text page via [employer] Paging/Directory   ______________________________________________________________________    Interval History   Patient unable to provide history. Breathing slightly labored this AM. Nurse aware and PRN morphine given. Increased oral secretions, glycopyrrolate and atropine being given. Per RN, bladder scan >700 ml this AM, hernandez catheter ordered.     Updated Son, Wilner, all  questions answered.     Physical Exam   Vital Signs:           Resp: 26        Weight: 0 lbs 0 oz  GENERAL: Eye closed, unresponsive to voice and gentle touch. Diaphoretic.   HEENT: Mucous membranes moist. Increased oral secretions.  CV: Tachycardic.   RESPIRATORY: Breathing lightly labored.   NEUROLOGICAL: Unresponsive.   EXTREMITIES: extremity warm. Pulses weak.   SKIN: No jaundice. No rashes.     Medical Decision Making     30 MINUTES SPENT BY ME on the date of service doing chart review, history, exam, documentation & further activities per the note.

## 2023-02-24 NOTE — PLAN OF CARE
Goal Outcome Evaluation:     Shift Note: 1900 - 2330     [facility], VS and full assessment deferred. FORTUNATO orientation, pt is unresponsive. A+2 with lift, T/R q 2 hours. PRN oral Roxanol was added and given to pt x 2. Scheduled robinul also added for secretions. Incontinent of B/B w/ little to no UOP, no BM this shift. Scattered bruising on upper and lower extremities. PIV on left and right are SL. [facility] hosp[facility] came to see today.

## 2023-02-24 NOTE — PROGRESS NOTES
Pt unresponsive, FORTUNATO orientation. PRN roxanol given x2 for pain/dyspnea. PRN atropine given x1 for secretions along w/ scheduled robinul. Large amount of thick oral secretions. T/R Q2h w/ oral cares. Bladder scan at 0830 = 767; hernandez placed w/ 900 ml out, dark jatin urine. Scattered bruising & mottling noted.     Pt passed at 1135; family present at bedside. House NP notified. Granddaughter notified the rest of family. Belongings sent with family.

## 2023-02-24 NOTE — DISCHARGE SUMMARY
[facility]    Death Summary - Hospitalist Service     Date of Admission:  2/23/2023  Date of Death: 2/24/2023  Discharging Provider: JOCE Ladd/Dr. Child     Discharge Diagnoses   Left TIFFANIE/MCA ischemic infarct  CAD  HLD  HTN  AV insiffiency  Polyclonal hypergammaglobulinemia    Cause of death: CVA    Hospital Course   Vero Mcfarland is a 89 year old female with hx of CAD, HLD, HTN, AV insufficieny, polyclonal hypergammaglobulinemia, vertigo, muscular degeneration who presented 2/20/2023 after being found unresponsive. In the ED found to have L TIFFANIE/MCA territory ischemic infarct due to L proximal M2 occlusion with poor collaterals and L A2 occlusion who transitioned to comfort focused cares after discussion with family. Transitioned to inpatient hospice 2/23/23. Symptoms managed with as needed morphine, atropine, Robinul. Jones catheter was placed AM of 2/24/23 for urinary retention. Patient passed with family at bedside on 2/24/23.       JOCE Ladd  [facility]  ______________________________________________________________________      Significant Results and Procedures   Most Recent 3 CBC's:Recent Labs   Lab Test 02/20/23  0937   WBC 9.3   HGB 12.0   MCV 88        Most Recent 3 BMP's:Recent Labs   Lab Test 02/20/23  0937      POTASSIUM 3.6   CHLORIDE 104   CO2 28   BUN 16.9   CR 0.66   ANIONGAP 11   MONTY 9.1   *   ,   Results for orders placed or performed during the hospital encounter of 02/20/23   CTA Head Neck w Contrast    Narrative    CT ANGIOGRAM OF THE HEAD AND NECK WITH CONTRAST February 20, 2023 9:26  AM     HISTORY: Code stroke.     TECHNIQUE: CT angiography with an injection of 75 [id number]-370 IV with  scans through the head and neck. Images were transferred to a separate  3-D workstation where multiplanar reformations and 3-D images were  created. Estimates of carotid stenoses are made relative to the  distal  internal carotid artery diameters except as noted. Radiation dose for  this scan was reduced using automated exposure control, adjustment of  the mA and/or kV according to patient size, or iterative  reconstruction technique.      COMPARISON: CT head of same day.     CT ANGIOGRAM HEAD FINDINGS: There is atherosclerosis of the bilateral  carotid siphons, without flow-limiting stenosis. There appears to be  developmental absence or marked hypoplasia of the A1 segment of the  right anterior cerebral artery. There is a dominant left anterior  cerebral artery that appears to primarily supply both anterior  cerebral artery territories. There is occlusion of the left anterior  cerebral artery in the A2 segment region, near the level of the genu  of the corpus callosum (series 9 image 466).    There is occlusion of the dominant proximal superior division M2  branch of the left middle cerebral artery. There is poor collateral  supply to the left MCA territory. There is poor collateral supply to  the left TIFFANIE territory. The high paramedian right frontal lobe appears  to be supplied by a small arterial branch arising directly from the A1  segment of the left anterior cerebral artery. There is some collateral  vascular enhancement seen in the right pericallosal region.    The major proximal branches of the right middle cerebral artery appear  to be patent. The bilateral vertebral arteries, the basilar artery,  and the proximal branches of the posterior cerebral arteries are  patent. No intracranial aneurysm or high flow vascular malformation is  identified.    CT ANGIOGRAM NECK FINDINGS: Atherosclerosis of the aortic arch is  noted. There is mild to moderate focal stenosis at the origin of the  left subclavian artery. Otherwise, mild atherosclerosis of the origins  of the brachiocephalic and left common carotid arteries without  significant stenosis. Conventional three-vessel aortic arch branching  pattern.    Right  carotid artery: The right common and internal carotid arteries  are patent. Mild atherosclerotic disease at the carotid bifurcation  and proximal internal carotid artery without significant stenosis by  NASCET criteria.     Left carotid artery: The left common and internal carotid arteries are  patent. Mild atherosclerosis of the left carotid bifurcation with less  than 50% stenosis by NASCET criteria.     Vertebral arteries: Vertebral arteries are patent without evidence of  dissection. No significant stenosis.     Other findings: Marked atrophy or absence of the thyroid gland.  Findings of previous median sternotomy noted. There is a small  retention cyst or polyp in the posterior right ethmoid region. Small  amount of aerated secretions in the left sphenoid sinus. There are  presumed areas of fibrosis in the lung apices bilaterally.  Degenerative changes noted in the mid to lower cervical spine and in  the partially visualized thoracic spine.      Impression    IMPRESSION:  1. Occlusion of the proximal dominant superior division M2 branch of  the left middle cerebral artery. Poor collateral supply in the left  MCA superior division territory.  2. 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 Occlusion of a predominantly azygos configuration dominant  anterior cerebral artery at the distal A2 segment with poor collateral  supply in the left TIFFANIE territory. There is some collateral  flow/vascular supply in the right TIFFANIE territory, although the  bilateral TIFFANIE territories are at risk for infarction.  3. Mild atherosclerosis in the carotid siphons without significant  stenosis.  4. Bilateral carotid bifurcation atherosclerosis with less than 50%  stenosis by NASCET criteria.  5. Patent bilateral cervical vertebral arteries.    Findings from the noncontrast head CT and CTA head and neck were  discussed by myself with Dr. Robles at 9:40 AM on 2/20/2023.     SURENDRA CARRASQUILLO MD         SYSTEM ID:   [id number]   CT Head w/o Contrast    Narrative    CT SCAN OF THE HEAD WITHOUT CONTRAST February 20, 2023 9:25 AM     HISTORY: Code stroke.    TECHNIQUE: Axial images of the head and coronal reformations without  IV contrast material. Radiation dose for this scan was reduced using  automated exposure control, adjustment of the mA and/or kV according  to patient size, or iterative reconstruction technique.    COMPARISON: None.    FINDINGS: The ventricles appear proportionate to the sulci. No  definite hydrocephalus. There is moderate generalized brain  parenchymal volume loss. There appears to be probable chronic infarct  involving the left inferior frontal gyrus. Small focus of  hypoattenuation in the right corona radiata (series 3 image 19)  concerning for age-indeterminate ischemic change. Elsewhere, moderate  patchy and confluent nonspecific hypoattenuation in the cerebral white  matter which likely represents sequela of chronic small vessel  ischemic disease. Small focus of hypoattenuation in the right  cerebellar hemisphere (series 3 image 6) concerning for  age-indeterminate infarct, potentially chronic. No acute intracranial  hemorrhage, extra-axial fluid collection, or mass effect/herniation.  Scattered atherosclerotic calcifications.    Bilateral lens implants. Visualized orbits otherwise appear normal.  Visualized aspects of the paranasal sinuses, mastoids, and middle ear  cavities appear clear. The bony calvarium and bones of the skull base  appear intact. Bilateral temporomandibular joint degenerative changes.      Impression    IMPRESSION:  1. No acute intrarenal hemorrhage, extraaxial fluid collection, or  mass effect.  2. Chronic infarct in the left inferior frontal gyrus.  3. Small foci of probable age-indeterminate ischemic change in the  right corona radiata and right cerebellar hemisphere. Consider MRI for  further evaluation.  4. Brain atrophy and presumed chronic small vessel ischemic changes,  as  described.    SURENDRA CARRASQUILLO MD         SYSTEM ID:  [id number]       Consultations This Hospital Stay   PHARMACY IP CONSULT  [facility] ADULT CONSULT    Primary Care Physician   Physician No Ref-Primary    Time Spent on this Encounter   IJessica PA, personally saw the patient today and spent greater than 30 minutes discharging this patient.

## 2023-02-24 NOTE — PROGRESS NOTES
[facility]    Progress Note - [facility]    ______________________________________________________________________    [facility]  Contact Number: [phone]    - Providers: Please contact [facility] with changes in orders or clinical plan of care   - Nursing: Please contact [facility] with significant changes in patient condition  ______________________________________________________________________        Plan of Care Discussed with the Following:   - Nurse: Yamilex Culp RN  - Hospitalist/Rounding Provider: Jessica HOBSON    - Patient's Family/Preferred Contact: Wilner, fan  - Hospice Provider: Dr. Doug VALLECILLO Eligibility: Pain, terminal respiratory secretions    /cremation facility: [facility]  [place] [address] [place] [address]    Education provided: End of life symptoms, terminal secretion management, Hernandez placement    Pertinent assessment information:   Vero is laying in bed slightly turned towards her right side, pt is comatose and unresponsive; HOB is elevated 30-45 degrees.  Patient has an tracey cross on her forehead; lorraine attempted to come into the room, but left to provide modesty for the patient with hernandez placement.   Granddaughter Amna is present at bedside. TRS Stage 2 phase 2, tachypneic RR 22, regular, no periods of apnea noted at time of assessment; Rhochi x all lobes; receiving scheduled Robinul Q4H, PRN Atropine, PRN Roxanol Q1H x 10 doses in the last 24 hours.  Pt has PRN IV Morphine available [id number] for dyspnea with 0 doses administered in the last 24 hours. Bedside RN provided oral suctioning; encouraged not to deep suction patient as it can create increased production.  Encouraged bedside RN to keep HOB elevated > 30 degrees at a minimum and positioning patient on their complete right/left side with repositions. Pt had decreased urine output overnight and was found  to have a retention volume of 767 mL this AM; order for hernandez catheter was received and being placed at time of visit.  Last BM on 2/21 according to chart; noted to be incontinent. Bilateral feet are cool to the touch; pedal pulses +1; bilateral upper extremities and legs are warm.  Generalized bruising; difficult to differentiate bruising from mottling, pt had a fall with CVA. Discharge planning patient would return to [facility] on Hospice services once secretions and dyspnea are controlled.      Update:  Bedside RN Yamilex called to let me know patient passed away peacefully at 1135 AM. Granddaughter Amna at bedside.  She shared stories of their relationship and the uncanny sense of humor her grandmother had. I offered lorraine services to the family member, she wanted to wait until her sons arrived to decide on that. Granddaughter asked for some privacy to make some phone calls.  Informed Amna to let the bedside nurse know if she needed anything and left my phone number with her to reach out if she needed anything.   Hospice recommendations:  Hernandez placed at bedside during visit; order obtained from hospitalist.  Encouraged maintaining HOB > 30 degrees, complete right/left side repositioning for gravity to support in maintaining airway patency with TRS.      ryanne Rodriguez RN  [facility]  Contact information available via [facility] Paging/Directory

## 2023-02-24 NOTE — PLAN OF CARE
[phone]  [facility]. VS and full assessment deferred. FORTUNATO orientation, pt is unresponsive. L/R PIV SL'd. PRN roxanol given, available Q1H. T/R Q2H along w/ oral cares. Purewick in place w/ little UOP. Discharge pending.